# Patient Record
Sex: MALE | Race: WHITE | NOT HISPANIC OR LATINO | Employment: PART TIME | URBAN - NONMETROPOLITAN AREA
[De-identification: names, ages, dates, MRNs, and addresses within clinical notes are randomized per-mention and may not be internally consistent; named-entity substitution may affect disease eponyms.]

---

## 2022-03-23 ENCOUNTER — OFFICE VISIT (OUTPATIENT)
Dept: FAMILY MEDICINE CLINIC | Facility: CLINIC | Age: 57
End: 2022-03-23

## 2022-03-23 VITALS
HEART RATE: 83 BPM | DIASTOLIC BLOOD PRESSURE: 78 MMHG | TEMPERATURE: 97.3 F | BODY MASS INDEX: 34.13 KG/M2 | OXYGEN SATURATION: 95 % | SYSTOLIC BLOOD PRESSURE: 124 MMHG | RESPIRATION RATE: 15 BRPM | HEIGHT: 72 IN | WEIGHT: 252 LBS

## 2022-03-23 DIAGNOSIS — M79.601 PAIN IN BOTH UPPER EXTREMITIES: ICD-10-CM

## 2022-03-23 DIAGNOSIS — M79.602 PAIN IN BOTH UPPER EXTREMITIES: ICD-10-CM

## 2022-03-23 DIAGNOSIS — R53.83 FATIGUE, UNSPECIFIED TYPE: Primary | ICD-10-CM

## 2022-03-23 DIAGNOSIS — F43.21 GRIEF REACTION: ICD-10-CM

## 2022-03-23 DIAGNOSIS — R20.0 BILATERAL FINGER NUMBNESS: ICD-10-CM

## 2022-03-23 DIAGNOSIS — F32.9 REACTIVE DEPRESSION: ICD-10-CM

## 2022-03-23 PROCEDURE — 99204 OFFICE O/P NEW MOD 45 MIN: CPT | Performed by: FAMILY MEDICINE

## 2022-03-23 NOTE — PROGRESS NOTES
New Patient Office Visit      Patient Name: Nilay Olvera  : 1965   MRN: [id number]     Chief Complaint:    Chief Complaint   Patient presents with   • Depression     ESTABLISHING PCP/ PT RECENTLY LOST HIS WIFE AND IS VERY DEPRESSED        History of Present Illness: Nilay Olvera is a 56 y.o. male who is here today to establish care and to work-up a few problems:  #1 and the most important for him is his bilateral arm pain in the anterior arms at his elbows occasionally up to his shoulders.  He contracted Covid  at his mother-in-law's  and then went to the emergency room for evaluation and treatment he said he only got 1 bag of IV fluids and was sent home even before that complete fluid bag completed he says he was dehydrated went home and then was walking around and fell over on top of a case of water bottles was not sure how long he was out but got up and was having arm pain.  He works as a  and had been off work for Covid and then tried to go back to work in January was only able to work 1 week kind of got fed up with how his boss and coworkers  were treating him and so he left work.  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  He fell shortly after his ER visit in December maybe about  or so [facility] they did his right elbow on the day after [place]-- x-rays were negative.  He says it hurts to raise his arms up above his head and cannot lift anything hard to do any curling type exercise.  He says he is upset with the hospital that they kicked him out of the hospital before he got enough IV fluids and he knows that the only reason he passed out was because he was dehydrated and did not get enough IV  fluids for his Covid infection.  His wife passed away  from Covid and he is not happy about her care there at the hospital as well.  #2  Depression and grief reaction says he feels very depressed has no focus or attention says everything seems hazy.  Has no energy.  #3.  Post Covid syndrome-says he has no smell or taste, still feels maybe short of breath at times, achy says is gets rumble all the time.    #4..  He does not see doctors much and is on no medicines other than occasional Advil which does help his arm pain and helps him sleep.  He has no other medications and no known drug allergies.      Subjective      Review of Systems:   Review of Systems    Past Medical History: History reviewed. No pertinent past medical history.    Past Surgical History: History reviewed. No pertinent surgical history.    Family History:   Family History   Problem Relation Age of Onset   • No Known Problems Mother    • No Known Problems Father    • No Known Problems Sister    • No Known Problems Brother    • No Known Problems Maternal Grandmother    • No Known Problems Maternal Grandfather    • No Known Problems Paternal Grandmother    • No Known Problems Paternal Grandfather        Social History:   Social History     Socioeconomic History   • Marital status:    Tobacco Use   • Smoking status: Former Smoker     Packs/day: 1.00     Years: 44.00     Pack years: 44.00     Types: Cigarettes     Quit date:      Years since quittin.2   • Smokeless tobacco: Never Used   Vaping Use   • Vaping Use: Never used   Substance and Sexual Activity   • Alcohol use: Never   • Drug use: Yes     Types: Marijuana   • Sexual activity: Defer       Medications:   No current outpatient medications on file.    Allergies:   No Known Allergies    Objective     Physical Exam:  Vital Signs:   Vitals:    22 1459   BP: 124/78   BP Location: Right arm   Patient Position: Sitting   Cuff Size: Large Adult   Pulse: 83   Resp: 15  "  Temp: 97.3 °F (36.3 °C)   SpO2: 95%   Weight: 114 kg (252 lb)   Height: 181.6 cm (71.5\")   PainSc:   2   PainLoc: Arm     Body mass index is 34.66 kg/m².     Physical Exam  Vitals and nursing note reviewed.   Constitutional:       Appearance: Normal appearance. He is obese.   HENT:      Head: Normocephalic and atraumatic.   Eyes:      Extraocular Movements: Extraocular movements intact.      Pupils: Pupils are equal, round, and reactive to light.   Cardiovascular:      Rate and Rhythm: Normal rate and regular rhythm.      Pulses:           Radial pulses are 2+ on the right side and 2+ on the left side.      Comments: Well-healed scar over distal right forearm  Pulmonary:      Effort: Pulmonary effort is normal.      Breath sounds: Normal breath sounds.   Musculoskeletal:         General: Normal range of motion.      Cervical back: Normal range of motion and neck supple.      Right lower leg: No edema.      Left lower leg: No edema.   Skin:     General: Skin is warm and dry.   Neurological:      General: No focal deficit present.      Mental Status: He is alert.      Cranial Nerves: No cranial nerve deficit.      Motor: No weakness.      Comments: Normal strength of both upper extremities but he had some pain reported doing some of the testing.  Mild tenderness palpation of the anterior arms at the elbows and headed up towards the shoulders bilaterally right little worse than left   Psychiatric:         Mood and Affect: Mood normal.         Behavior: Behavior normal.         Thought Content: Thought content normal.         Judgment: Judgment normal.      Comments: He denies any homicidal suicidal thoughts.  Interacted well during the exam and interview process.         Assessment / Plan      Assessment/Plan:   Diagnoses and all orders for this visit:    1. Fatigue, unspecified type (Primary)  -     CBC w AUTO Differential; Future  -     Comprehensive metabolic panel; Future  -     CK; Future  -     Vitamin B12; " Future  -     CBC w AUTO Differential  -     Comprehensive metabolic panel  -     CK  -     Vitamin B12    2. Reactive depression    3. Grief reaction    4. Pain in both upper extremities    5. Bilateral finger numbness         We will get some basic labs CBC CMP CK and B12 to evaluate and will try to find the [facility] report from [facility] on his [facility] but this was done back in December.  Parenteral diagnosis could include partial tear of the biceps muscles there but I am not sure why he  this far out from the injury that occurred in December.  We discussed possible referral to neurology/orthopedics will have him come back in a couple weeks and reevaluate at that time.  In the meantime he may continue as needed use of his Advil for pain as that has helped him so far risks of GI bleed kidney damage discussed with him.  If any problems worsening sooner return sooner or go to the emergency room.      Follow Up:   Return in about 2 weeks (around 4/6/2022) for Recheck.    Levi Mcrae MD  [facility]

## 2022-03-24 LAB
ALBUMIN SERPL-MCNC: 4.7 G/DL (ref 3.8–4.9)
ALBUMIN/GLOB SERPL: 1.8 {RATIO} (ref 1.2–2.2)
ALP SERPL-CCNC: 88 IU/L (ref 44–121)
ALT SERPL-CCNC: 14 IU/L (ref 0–44)
AST SERPL-CCNC: 19 IU/L (ref 0–40)
BASOPHILS # BLD AUTO: 0.1 X10E3/UL (ref 0–0.2)
BASOPHILS NFR BLD AUTO: 1 %
BILIRUB SERPL-MCNC: 0.6 MG/DL (ref 0–1.2)
BUN SERPL-MCNC: 12 MG/DL (ref 6–24)
BUN/CREAT SERPL: 13 (ref 9–20)
CALCIUM SERPL-MCNC: 9.5 MG/DL (ref 8.7–10.2)
CHLORIDE SERPL-SCNC: 103 MMOL/L (ref 96–106)
CK SERPL-CCNC: 230 U/L (ref 41–331)
CO2 SERPL-SCNC: 24 MMOL/L (ref 20–29)
CREAT SERPL-MCNC: 0.89 MG/DL (ref 0.76–1.27)
EGFRCR SERPLBLD CKD-EPI 2021: 101 ML/MIN/1.73
EOSINOPHIL # BLD AUTO: 0.2 X10E3/UL (ref 0–0.4)
EOSINOPHIL NFR BLD AUTO: 2 %
ERYTHROCYTE [DISTWIDTH] IN BLOOD BY AUTOMATED COUNT: 13.4 % (ref 11.6–15.4)
GLOBULIN SER CALC-MCNC: 2.6 G/DL (ref 1.5–4.5)
GLUCOSE SERPL-MCNC: 74 MG/DL (ref 65–99)
HCT VFR BLD AUTO: 45.7 % (ref 37.5–51)
HGB BLD-MCNC: 15.5 G/DL (ref 13–17.7)
IMM GRANULOCYTES # BLD AUTO: 0 X10E3/UL (ref 0–0.1)
IMM GRANULOCYTES NFR BLD AUTO: 0 %
LYMPHOCYTES # BLD AUTO: 3 X10E3/UL (ref 0.7–3.1)
LYMPHOCYTES NFR BLD AUTO: 33 %
MCH RBC QN AUTO: 31.1 PG (ref 26.6–33)
MCHC RBC AUTO-ENTMCNC: 33.9 G/DL (ref 31.5–35.7)
MCV RBC AUTO: 92 FL (ref 79–97)
MONOCYTES # BLD AUTO: 0.8 X10E3/UL (ref 0.1–0.9)
MONOCYTES NFR BLD AUTO: 9 %
NEUTROPHILS # BLD AUTO: 4.8 X10E3/UL (ref 1.4–7)
NEUTROPHILS NFR BLD AUTO: 55 %
PLATELET # BLD AUTO: 180 X10E3/UL (ref 150–450)
POTASSIUM SERPL-SCNC: 4.1 MMOL/L (ref 3.5–5.2)
PROT SERPL-MCNC: 7.3 G/DL (ref 6–8.5)
RBC # BLD AUTO: 4.98 X10E6/UL (ref 4.14–5.8)
SODIUM SERPL-SCNC: 142 MMOL/L (ref 134–144)
VIT B12 SERPL-MCNC: 449 PG/ML (ref 232–1245)
WBC # BLD AUTO: 8.9 X10E3/UL (ref 3.4–10.8)

## 2022-04-19 ENCOUNTER — OFFICE VISIT (OUTPATIENT)
Dept: FAMILY MEDICINE CLINIC | Facility: CLINIC | Age: 57
End: 2022-04-19

## 2022-04-19 VITALS
OXYGEN SATURATION: 95 % | HEART RATE: 84 BPM | RESPIRATION RATE: 14 BRPM | TEMPERATURE: 97.5 F | HEIGHT: 71 IN | DIASTOLIC BLOOD PRESSURE: 72 MMHG | WEIGHT: 251 LBS | BODY MASS INDEX: 35.14 KG/M2 | SYSTOLIC BLOOD PRESSURE: 114 MMHG

## 2022-04-19 DIAGNOSIS — M79.601 PAIN IN BOTH UPPER EXTREMITIES: Primary | ICD-10-CM

## 2022-04-19 DIAGNOSIS — M79.602 PAIN IN BOTH UPPER EXTREMITIES: Primary | ICD-10-CM

## 2022-04-19 PROCEDURE — 99213 OFFICE O/P EST LOW 20 MIN: CPT | Performed by: FAMILY MEDICINE

## 2022-04-19 NOTE — PROGRESS NOTES
New Patient Office Visit      Patient Name: Nilay Olvera  : 1965   MRN: [id number]     Chief Complaint:    Chief Complaint   Patient presents with   • Follow-up     Pt is to follow up on bloodwork and xrays       History of Present Illness: Nilay Olvera is a 56 y.o. male who is here today to   follow-up on his blood work.  We reviewed this basically all normal.  I do not have a copy of [facility] after his fall back in December day after [place].    He still sore in both arms right little worse than the left basically about the medial part of the elbow distal arm is where he is tender and sore says he can hardly lift anything or move it without getting pain shooting down the entire extremity.  [facility] he was told the x-rays were negative but that he tore the muscles and there.  And that should get better in 4 to 6 weeks it has not so he is now not able to work as a  and is here for follow-up.  Also of note he and his wife both had COVID and he passed away.  He still dealing with the grief does not want any medications for this.    When he had COVID he was getting dehydrated feels the ER did not give him enough IV fluids he came home and passed out fell he thinks up a case of water bottles what saved him from hitting the the tile floor.  Feels like he might of hyperextended his arms at the elbows during that syncopal episode .  He does have some soreness in trapezius and down the arms says if he jerks his arm quickly to catch a falling ball or object that will shoot pain in his arms too.  Says he is getting depressed where he cannot work and normally he is a big strong kendra now cannot ride his [employer]-- had to get a 3 pool.    Subjective      Review of Systems:   Review of Systems    Past Medical History: History reviewed. No pertinent past medical history.    Past Surgical History:   Past Surgical History:   Procedure Laterality Date  "  • LEG SURGERY Left     Left lower leg repair/pins placed   • WRIST FRACTURE SURGERY Right     plastic plate replaced broken bone       Family History:   Family History   Problem Relation Age of Onset   • No Known Problems Mother    • No Known Problems Father    • No Known Problems Sister    • No Known Problems Brother    • No Known Problems Maternal Grandmother    • No Known Problems Maternal Grandfather    • No Known Problems Paternal Grandmother    • No Known Problems Paternal Grandfather        Social History:   Social History     Socioeconomic History   • Marital status:    Tobacco Use   • Smoking status: Former Smoker     Packs/day: 1.00     Years: 44.00     Pack years: 44.00     Types: Cigarettes     Quit date:      Years since quittin.3   • Smokeless tobacco: Never Used   Vaping Use   • Vaping Use: Never used   Substance and Sexual Activity   • Alcohol use: Never   • Drug use: Yes     Types: Marijuana   • Sexual activity: Defer       Medications:   No current outpatient medications on file.    Allergies:   No Known Allergies    Objective     Physical Exam:  Vital Signs:   Vitals:    22 1310   BP: 114/72   BP Location: Left arm   Patient Position: Sitting   Cuff Size: Large Adult   Pulse: 84   Resp: 14   Temp: 97.5 °F (36.4 °C)   SpO2: 95%   Weight: 114 kg (251 lb)   Height: 180.3 cm (71\")   PainSc:   6   PainLoc: Arm     Body mass index is 35.01 kg/m².        Physical Exam  Constitutional:       Appearance: Normal appearance.   Neurological:      General: No focal deficit present.      Mental Status: He is alert and oriented to person, place, and time.      Comments: Third oriented white male no acute distress is muscular he has tenderness on the medial distal arm about the elbow more so than I expected for light palpation.  2+ radial pulses bilaterally.  When testing strength of upper extremities he says it hurts and is not able to resist me.  Mild palpable tenderness to the bilateral " trapezius muscles    Psychiatric:         Mood and Affect: Mood normal.         Behavior: Behavior normal.         Procedures    Assessment / Plan      Assessment/Plan:   Diagnoses and all orders for this visit:    1. Pain in both upper extremities (Primary)  -     Ambulatory Referral to Neurology  -     Ambulatory Referral to Orthopedic Surgery         Refer to orthopedics and neurology to make sure this is not nerve root compression coming from his C-spine or other neurological disorder--perhaps they can get an EMG.  And will have him see Ortho as well perhaps this is just muscle strain/tear that needs more time to rehab may be needs physical therapy?    He will continue with as needed Motrin he declined any treatment for depression/ grief reaction     follow-up in a couple months we will see how he is doing may consider Cymbalta at that point after his specialist evaluation.       Follow Up:   Return in about 2 months (around 6/19/2022) for Recheck.    Levi Mcrae MD  [facility] [place] [facility]

## 2022-05-10 ENCOUNTER — OFFICE VISIT (OUTPATIENT)
Dept: ORTHOPEDIC SURGERY | Facility: CLINIC | Age: 57
End: 2022-05-10

## 2022-05-10 VITALS
BODY MASS INDEX: 35.19 KG/M2 | DIASTOLIC BLOOD PRESSURE: 76 MMHG | SYSTOLIC BLOOD PRESSURE: 122 MMHG | WEIGHT: 251.32 LBS | HEIGHT: 71 IN

## 2022-05-10 DIAGNOSIS — M25.522 BILATERAL ELBOW JOINT PAIN: Primary | ICD-10-CM

## 2022-05-10 DIAGNOSIS — S59.802A HYPEREXTENSION INJURY OF LEFT ELBOW, INITIAL ENCOUNTER: ICD-10-CM

## 2022-05-10 DIAGNOSIS — M25.622 DECREASED RANGE OF MOTION OF LEFT ELBOW: ICD-10-CM

## 2022-05-10 DIAGNOSIS — S59.801A HYPEREXTENSION INJURY OF RIGHT ELBOW, INITIAL ENCOUNTER: ICD-10-CM

## 2022-05-10 DIAGNOSIS — M25.621 DECREASED RANGE OF MOTION OF RIGHT ELBOW: ICD-10-CM

## 2022-05-10 DIAGNOSIS — M25.521 BILATERAL ELBOW JOINT PAIN: Primary | ICD-10-CM

## 2022-05-10 PROCEDURE — 99203 OFFICE O/P NEW LOW 30 MIN: CPT | Performed by: PHYSICIAN ASSISTANT

## 2022-05-10 NOTE — PROGRESS NOTES
[facility] [place] [facility] Note    Subjective     Chief Complaint   Patient presents with   • Right Elbow - Pain   • Left Elbow - Pain   DOI: around 12/24/2021     FIDELINA Olvera is a 56 y.o. male.  Right-hand-dominant. New patient presents for evaluation of bilateral elbow pain.  DAVID: Patient fell ?hyperextnsion injury bilateral elbows resulting in chronic pain medial into anterior elbows (antecubital fossa), inability to fully extend or pronate. Now with decreased motion and persistent pain.    Occupation: , unable to work--unable to lift , flex elbows with weight, extend elbows.    Unfortunately his wife passed away from Covid 1/5/2022.    Pain scale: varies depending on activity attempted.  Severity of the pain moderate to severe at times, especially in right.  Quality of the pain aching, throbbing, shooting.  Associated symptoms swelling, bruising (medial elbows), giving away/buckling.  Activity related to pain working, driving, movement of joint.  Pain eased by nothing.  No reported numbness or tingling.  Prior treatments home exercises, OTC pain medication (Advil).    Notes difficulty with lifting, pulling, reaching, dressing, driving and overhead activities.    Denies fever, chills, night sweats or other constitutional symptoms.      History reviewed. No pertinent past medical history.   Past Surgical History:   Procedure Laterality Date   • LEG SURGERY Left     Left lower leg repair/pins placed   • WRIST FRACTURE SURGERY Right     plastic plate replaced broken bone      Family History   Problem Relation Age of Onset   • No Known Problems Mother    • No Known Problems Father    • No Known Problems Sister    • No Known Problems Brother    • No Known Problems Maternal Grandmother    • No Known Problems Maternal Grandfather    • No Known Problems Paternal Grandmother    • No Known Problems Paternal Grandfather      Social History     Socioeconomic History   • Marital status:   "  Tobacco Use   • Smoking status: Former Smoker     Packs/day: 1.00     Years: 44.00     Pack years: 44.00     Types: Cigarettes     Quit date:      Years since quittin.3   • Smokeless tobacco: Never Used   Vaping Use   • Vaping Use: Never used   Substance and Sexual Activity   • Alcohol use: Never   • Drug use: Yes     Types: Marijuana   • Sexual activity: Defer      No current outpatient medications on file prior to visit.     No current facility-administered medications on file prior to visit.      No Known Allergies     The following portions of the patient's history were reviewed and updated as appropriate: allergies, current medications, past family history, past medical history, past social history, past surgical history and problem list.    Review of Systems   Constitutional: Negative.    HENT: Negative.    Eyes: Negative.    Respiratory: Negative.    Cardiovascular: Negative.    Gastrointestinal: Negative.    Endocrine: Negative.    Genitourinary: Negative.    Musculoskeletal: Positive for arthralgias.   Skin: Negative.    Allergic/Immunologic: Negative.    Neurological: Negative.    Hematological: Negative.    Psychiatric/Behavioral: Negative.         Objective      Physical Exam  /76   Ht 180.3 cm (70.98\")   Wt 114 kg (251 lb 5.2 oz)   BMI 35.07 kg/m²     Body mass index is 35.07 kg/m².    GENERAL APPEARANCE: awake, alert & oriented x 3, in no acute distress and well developed, well nourished  PSYCH: normal mood and affect  LUNGS:  breathing nonlabored, no wheezing  EYES: sclera anicteric, pupils equal  CARDIOVASCULAR: palpable pulses. Capillary refill less than 2 seconds  INTEGUMENTARY: skin intact, no clubbing, cyanosis  NEUROLOGIC:  Normal Sensation        Ortho Exam  Bilateral Elbow--right > left symptomatic  Skin: Intact without any erythema, warmth. ?soft tissue swelling  Tenderness: Lat epicondyle/common ext tendons/ECRB negative.  Med epicondyle/common flx tendon and pronator " group negative.  Radial head negative.  Olecranon negative.  Triceps insertion negative.  Biceps insertion/antecubital fossa positive--withdrawals extremity to pain with palpation.   Motion: Extension 20/30° short of full extension.  Flexion 150°.  Supination 85°.  Pronation 45°.  Instability: Varus and valgus stress at 0°/30°/90° negative.  Axial load negative.  Strength: 4/5 flexors/biceps, pronation; 5/5 extensors, supinators  Testing: Resisted flexion elbow positive.  Restricted flexion the wrist mild  discomfort.  Hook test intact biceps tendon.  Resisted extension elbow negative.  Resisted extension wrist negative.    Cubital tunnel: Tinel's negative.  Elbow flexion test negative.  Motor: Grossly intact R/U/M/AIN/PIN.  Sensory: Grossly intact to light touch R/U/M.  Vascular: 2+ radial pulse with brisk capillary refill into each digit.      Imaging/Studies  Ordered bilateral elbows plain films.  Imaging read/interpreted by Dr. Joe.    Imaging Results (Last 7 Days)     Procedure Component Value Units Date/Time    XR Elbow 3+ View Bilateral [[id number]] Resulted: 05/10/22 1255     Updated: 05/10/22 1256    Narrative:      Bilateral Elbow X-Ray    Indication: Pain    Views: AP, oblique and Lateral     Comparison: None    Findings:  No fracture  No bony lesion  Normal soft tissues  Degenerative changes are noted at the anterior aspect of the ulnohumeral   articulation most clearly demonstrated on the lateral views of each elbow    Impression: Degenerative changes bilateral elbows.  No acute bony   abnormality appreciated.                  Assessment/Plan        ICD-10-CM ICD-9-CM   1. Bilateral elbow joint pain  M25.521 719.42    M25.522    2. Decreased range of motion of right elbow  M25.621 719.52   3. Decreased range of motion of left elbow  M25.622 719.52   4. Hyperextension injury of right elbow, initial encounter  S59.801A 959.3   5. Hyperextension injury of left elbow, initial encounter  S59.802A 959.3        Orders Placed This Encounter   Procedures   • XR Elbow 3+ View Bilateral   • MRI Elbow Left Without Contrast   • MRI Elbow Right Without Contrast        -Bilateral elbow pain, decreased motion (right greater than left) due to bilateral elbow hyperextension injuries.  -Possible arthrofibrosis to both elbows due to decreased motion.  -Based on chronicity, pain, decreased motion ordered MRI to further assess. If only can do one elbow at a time then patient wants to proceed with right first.  -Continue OTC pain medication.  -Follow up after MRI's completed to discuss results and treatment options. Appointment needs to be on Tuesday or Thursday.   -Questions and concerns answered.    History, exam and imaging discussed with Dr. Joe, who agrees with the above assessment and plan.      Medical Decision Making  Management Options : over-the-counter medicine  Data/Risk: radiology tests       Darcy Figueroa PA-C  05/10/22  23:23 EDT               EMR Dragon/Transcription disclaimer:  Much of this encounter note is an electronic transcription of spoken language to printed text. Electronic transcription of spoken language may permit erroneous, or at times, nonsensical words or phrases to be inadvertently transcribed. Although I have reviewed the note for such errors, some may still exist.

## 2022-05-26 ENCOUNTER — OFFICE VISIT (OUTPATIENT)
Dept: FAMILY MEDICINE CLINIC | Facility: CLINIC | Age: 57
End: 2022-05-26

## 2022-05-26 VITALS
BODY MASS INDEX: 33.02 KG/M2 | HEART RATE: 78 BPM | DIASTOLIC BLOOD PRESSURE: 80 MMHG | OXYGEN SATURATION: 98 % | HEIGHT: 72 IN | SYSTOLIC BLOOD PRESSURE: 122 MMHG | WEIGHT: 243.8 LBS | TEMPERATURE: 98.4 F

## 2022-05-26 DIAGNOSIS — F43.21 GRIEF REACTION: Primary | ICD-10-CM

## 2022-05-26 PROCEDURE — 99215 OFFICE O/P EST HI 40 MIN: CPT | Performed by: PHYSICIAN ASSISTANT

## 2022-05-26 NOTE — PROGRESS NOTES
Answers for HPI/ROS submitted by the patient on 2022  What is the primary reason for your visit?: Other  Please describe your symptoms.: bad depression  Have you had these symptoms before?: No  How long have you been having these symptoms?: Greater than 2 weeks  Please list any medications you are currently taking for this condition.: none  Please describe any probable cause for these symptoms. : lost motherinlaw, lost my best friend my wife, lost job  on and on          Patient Office Visit      Patient Name: Nilay Olvera  : 1965   MRN: [id number]     Chief Complaint:    Chief Complaint   Patient presents with   • Depression     Not sleeping, not eating, feeling hopeless.       History of Present Illness: Nilay Olvera is a 56 y.o. male who is here today asking for help with his depression.  He lost his mother-in-law to COVID in December.  He and his wife both caught COVID at the  and he lost his wife to COVID in January.  He says they have been together 20 years and were best friends and he is devastated.  He thought he could handle on his own but he is now asking for some help.  He is talked to Dr. Mcrae about this in the past and previously had declined trying any type of medication.  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  He says currently he does not have to work as he has the financial means to continue to pay all of his bills.  He just wants to get to a point where he is not crying all the time.  He says he isolates and really does not want to get out and do anything.  He denies any thoughts of suicide or homicide.    Subjective      Review of Systems:   Review of Systems   Psychiatric/Behavioral: Positive for decreased concentration and dysphoric mood. Negative for suicidal ideas.        Past Medical History:   Past Medical History:   Diagnosis Date   • Depression        Past Surgical  "History:   Past Surgical History:   Procedure Laterality Date   • LEG SURGERY Left     Left lower leg repair/pins placed   • WRIST FRACTURE SURGERY Right     plastic plate replaced broken bone       Family History:   Family History   Problem Relation Age of Onset   • No Known Problems Mother    • No Known Problems Father    • No Known Problems Sister    • No Known Problems Brother    • No Known Problems Maternal Grandmother    • No Known Problems Maternal Grandfather    • No Known Problems Paternal Grandmother    • No Known Problems Paternal Grandfather        Social History:   Social History     Socioeconomic History   • Marital status:    Tobacco Use   • Smoking status: Former Smoker     Packs/day: 1.00     Years: 44.00     Pack years: 44.00     Types: Cigarettes     Quit date:      Years since quittin.4   • Smokeless tobacco: Never Used   Vaping Use   • Vaping Use: Never used   Substance and Sexual Activity   • Alcohol use: Never   • Drug use: Yes     Types: Marijuana   • Sexual activity: Defer       Allergies:   No Known Allergies    Objective     Physical Exam:  Vital Signs:   Vitals:    22 1330   BP: 122/80   Pulse: 78   Temp: 98.4 °F (36.9 °C)   SpO2: 98%   Weight: 111 kg (243 lb 12.8 oz)   Height: 181.6 cm (71.5\")     Body mass index is 33.53 kg/m².          Physical Exam  Constitutional:       Appearance: He is obese.   Psychiatric:         Attention and Perception: Attention normal.         Mood and Affect: Mood is depressed. Affect is tearful.         Speech: Speech normal.         Behavior: Behavior normal. Behavior is cooperative.         Thought Content: Thought content normal.         Cognition and Memory: Cognition normal.         Judgment: Judgment normal.      Comments: Patient is very sad.         Procedures    Assessment / Plan      Assessment/Plan:   Diagnoses and all orders for this visit:    1. Grief reaction (Primary)  -     sertraline (Zoloft) 50 MG tablet; Take 1 " tablet by mouth Daily.  Dispense: 30 tablet; Refill: 0    Most of the visit spent supportive counseling and listening to patient.  I validated that what he is going through is pretty normal.  The first year will be the hardest as he has to cycle through the different seasons without his wife.  He is having a very difficult time functioning so we will try him on low-dose sertraline and see if this will take the edge off.  He knows that this is not going to help after just a few days.  I am going to have him follow-up in 1 week as we need to monitor him closely.    Medications:     Current Outpatient Medications:   •  sertraline (Zoloft) 50 MG tablet, Take 1 tablet by mouth Daily., Disp: 30 tablet, Rfl: 0    I spent 40 minutes caring for Nilay on this date of service. This time includes time spent by me in the following activities:preparing for the visit, obtaining and/or reviewing a separately obtained history, performing a medically appropriate examination and/or evaluation , counseling and educating the patient/family/caregiver and documenting information in the medical record    Follow Up:   Return in about 1 week (around 6/2/2022) for Recheck.    Alma Delia Garza PA-C   [facility]

## 2022-06-02 ENCOUNTER — OFFICE VISIT (OUTPATIENT)
Dept: FAMILY MEDICINE CLINIC | Facility: CLINIC | Age: 57
End: 2022-06-02

## 2022-06-02 ENCOUNTER — TELEPHONE (OUTPATIENT)
Dept: ORTHOPEDIC SURGERY | Facility: CLINIC | Age: 57
End: 2022-06-02

## 2022-06-02 VITALS
SYSTOLIC BLOOD PRESSURE: 140 MMHG | TEMPERATURE: 97.7 F | BODY MASS INDEX: 33.32 KG/M2 | DIASTOLIC BLOOD PRESSURE: 90 MMHG | RESPIRATION RATE: 15 BRPM | WEIGHT: 238 LBS | HEART RATE: 86 BPM | OXYGEN SATURATION: 99 % | HEIGHT: 71 IN

## 2022-06-02 DIAGNOSIS — F43.21 GRIEF REACTION: ICD-10-CM

## 2022-06-02 PROBLEM — F43.20 GRIEF REACTION: Status: ACTIVE | Noted: 2022-06-02

## 2022-06-02 PROCEDURE — 99213 OFFICE O/P EST LOW 20 MIN: CPT | Performed by: PHYSICIAN ASSISTANT

## 2022-06-02 NOTE — TELEPHONE ENCOUNTER
Provider: DICKINSON  Caller: REBECCA JARRETT  Relationship to Patient: PATIENT  Pharmacy: N/A  Phone Number: [phone]  Reason for Call: PATIENT MRI SCHEDULED FOR 6.18.22 AND PATIENT WOULD LIKE TO BE SEEN FOR F/U/RESULTS BEFORE FIRST AVAILABLE, WHICH IS 6.30.22  When was the patient last seen:5.10.22

## 2022-06-02 NOTE — PROGRESS NOTES
Patient Office Visit      Patient Name: Nilay Olvera  : 1965   MRN: [id number]     Chief Complaint:    Chief Complaint   Patient presents with   • Depression       History of Present Illness: Nilay Olvera is a 56 y.o. male who is here today to follow-up on grief/depression.  We started him on sertraline 50 mg last week.  He can already tell this is helping however it is making him very tired.  He had been unable to sleep prior and thinks maybe he is tired because his anxiety is somewhat better.  Not crying as much.    Subjective    Answers for HPI/ROS submitted by the patient on 2022  What is the primary reason for your visit?: Other  Please describe your symptoms.: follow up  Have you had these symptoms before?: Yes  How long have you been having these symptoms?: Greater than 2 weeks      Review of Systems:   Review of Systems   Constitutional: Positive for fatigue.   Psychiatric/Behavioral: Positive for dysphoric mood ( Improving) and sleep disturbance ( Improving). The patient is not nervous/anxious.         Past Medical History:   Past Medical History:   Diagnosis Date   • Depression        Past Surgical History:   Past Surgical History:   Procedure Laterality Date   • LEG SURGERY Left     Left lower leg repair/pins placed   • WRIST FRACTURE SURGERY Right     plastic plate replaced broken bone       Family History:   Family History   Problem Relation Age of Onset   • No Known Problems Mother    • No Known Problems Father    • No Known Problems Sister    • No Known Problems Brother    • No Known Problems Maternal Grandmother    • No Known Problems Maternal Grandfather    • No Known Problems Paternal Grandmother    • No Known Problems Paternal Grandfather        Social History:   Social History     Socioeconomic History   • Marital status:    Tobacco Use   • Smoking status: Former Smoker     Packs/day: 1.00     Years: 44.00     Pack years: 44.00     Types: Cigarettes     Quit date:  "     Years since quittin.4   • Smokeless tobacco: Never Used   Vaping Use   • Vaping Use: Never used   Substance and Sexual Activity   • Alcohol use: Never   • Drug use: Yes     Types: Marijuana   • Sexual activity: Defer       Allergies:   No Known Allergies    Objective     Physical Exam:  Vital Signs:   Vitals:    22 0803   BP: 140/90   BP Location: Right arm   Patient Position: Sitting   Cuff Size: Adult   Pulse: 86   Resp: 15   Temp: 97.7 °F (36.5 °C)   TempSrc: Temporal   SpO2: 99%   Weight: 108 kg (238 lb)   Height: 180.3 cm (71\")     Body mass index is 33.19 kg/m².          Physical Exam  Constitutional:       General: He is not in acute distress.     Appearance: Normal appearance.   Neurological:      Mental Status: He is alert.   Psychiatric:         Mood and Affect: Mood normal.         Behavior: Behavior normal.         Thought Content: Thought content normal.         Judgment: Judgment normal.         Procedures    Assessment / Plan      Assessment/Plan:   Diagnoses and all orders for this visit:    1. Grief reaction  Assessment & Plan:  Continue sertraline 50 mg for now.  I did write a new prescription to increase to 75 mg which she can do at his discretion and follow-up again in 6 weeks.  He has been a little hard on himself over his emotional state.  I asked him to think about how he would treat his best friend if he were going through something similar and he said he would be supportive and was there for him.  I asked him to try to do for himself what he would do for a good friend.    Orders:  -     sertraline (Zoloft) 50 MG tablet; Take 1.5 tablets by mouth Daily.  Dispense: 45 tablet; Refill: 0       Medications:     Current Outpatient Medications:   •  sertraline (Zoloft) 50 MG tablet, Take 1.5 tablets by mouth Daily., Disp: 45 tablet, Rfl: 0        Follow Up:   Return in about 6 weeks (around 2022) for Recheck.    Alma Delia Garza PA-C   [facility]   "

## 2022-06-02 NOTE — ASSESSMENT & PLAN NOTE
Continue sertraline 50 mg for now.  I did write a new prescription to increase to 75 mg which she can do at his discretion and follow-up again in 6 weeks.  He has been a little hard on himself over his emotional state.  I asked him to think about how he would treat his best friend if he were going through something similar and he said he would be supportive and was there for him.  I asked him to try to do for himself what he would do for a good friend.

## 2022-06-18 ENCOUNTER — APPOINTMENT (OUTPATIENT)
Dept: MRI IMAGING | Facility: HOSPITAL | Age: 57
End: 2022-06-18

## 2022-07-14 ENCOUNTER — OFFICE VISIT (OUTPATIENT)
Dept: FAMILY MEDICINE CLINIC | Facility: CLINIC | Age: 57
End: 2022-07-14

## 2022-07-14 VITALS
DIASTOLIC BLOOD PRESSURE: 80 MMHG | RESPIRATION RATE: 15 BRPM | TEMPERATURE: 98.6 F | SYSTOLIC BLOOD PRESSURE: 132 MMHG | HEART RATE: 78 BPM | WEIGHT: 233.8 LBS | HEIGHT: 71 IN | BODY MASS INDEX: 32.73 KG/M2 | OXYGEN SATURATION: 98 %

## 2022-07-14 DIAGNOSIS — F43.21 GRIEF REACTION: Primary | ICD-10-CM

## 2022-07-14 DIAGNOSIS — Z12.11 SCREENING FOR COLON CANCER: ICD-10-CM

## 2022-07-14 PROCEDURE — 99214 OFFICE O/P EST MOD 30 MIN: CPT | Performed by: PHYSICIAN ASSISTANT

## 2022-07-14 RX ORDER — SERTRALINE HYDROCHLORIDE 100 MG/1
100 TABLET, FILM COATED ORAL DAILY
Qty: 90 TABLET | Refills: 1 | Status: SHIPPED | OUTPATIENT
Start: 2022-07-14 | End: 2023-01-11

## 2022-07-14 NOTE — PROGRESS NOTES
Answers for HPI/ROS submitted by the patient on 2022  What is the primary reason for your visit?: Other  Please describe your symptoms.: follow up  Have you had these symptoms before?: Yes  How long have you been having these symptoms?: Greater than 2 weeks          Patient Office Visit      Patient Name: Nilay Olvera  : 1965   MRN: [id number]     Chief Complaint:    Chief Complaint   Patient presents with   • Depression       History of Present Illness: Nilay Olvera is a 57 y.o. male who is here today to follow-up on grief reaction after losing his wife to COVID in January.    Subjective      Review of Systems:   Review of Systems   Psychiatric/Behavioral: Positive for dysphoric mood.        Past Medical History:   Past Medical History:   Diagnosis Date   • Depression        Past Surgical History:   Past Surgical History:   Procedure Laterality Date   • LEG SURGERY Left     Left lower leg repair/pins placed   • WRIST FRACTURE SURGERY Right     plastic plate replaced broken bone       Family History:   Family History   Problem Relation Age of Onset   • No Known Problems Mother    • No Known Problems Father    • No Known Problems Sister    • No Known Problems Brother    • No Known Problems Maternal Grandmother    • No Known Problems Maternal Grandfather    • No Known Problems Paternal Grandmother    • No Known Problems Paternal Grandfather        Social History:   Social History     Socioeconomic History   • Marital status:    Tobacco Use   • Smoking status: Former Smoker     Packs/day: 1.00     Years: 44.00     Pack years: 44.00     Types: Cigarettes     Quit date:      Years since quittin.5   • Smokeless tobacco: Never Used   Vaping Use   • Vaping Use: Never used   Substance and Sexual Activity   • Alcohol use: Never   • Drug use: Yes     Types: Marijuana   • Sexual activity: Defer       Allergies:   No Known Allergies    Objective     Physical Exam:  Vital Signs:   Vitals:     "07/14/22 0805   BP: 132/80   BP Location: Right arm   Patient Position: Sitting   Cuff Size: Adult   Pulse: 78   Resp: 15   Temp: 98.6 °F (37 °C)   TempSrc: Temporal   SpO2: 98%   Weight: 106 kg (233 lb 12.8 oz)   Height: 180.3 cm (71\")     Body mass index is 32.61 kg/m².        Physical Exam  Constitutional:       General: He is not in acute distress.     Appearance: Normal appearance. He is obese.   Neurological:      Mental Status: He is alert.   Psychiatric:         Mood and Affect: Mood normal.         Behavior: Behavior normal.         Thought Content: Thought content normal.         Judgment: Judgment normal.      Comments: Mood is significantly improved from when I first started seeing him.         Procedures    Assessment / Plan      Assessment/Plan:   Diagnoses and all orders for this visit:    1. Grief reaction (Primary)  Assessment & Plan:  Mood is significantly improved since I first started seeing him.  He is currently on sertraline 75 mg/day.  He does this is helping and would like to increase to 100 mg.  He did try some grief counseling in the beginning and he said this just seemed to make it worse.  He wants to try to return to working on a month or 2.  He is working on the house that he has been renting and is in the process of planning to purchase this home.  He is also showing some interest in his own health saying that he has never had a colonoscopy and wants to be referred.  Good to have him follow-up again in 6 months but he should come back sooner if he is having any issues.    Orders:  -     sertraline (Zoloft) 100 MG tablet; Take 1 tablet by mouth Daily.  Dispense: 90 tablet; Refill: 1    2. Screening for colon cancer  -     Ambulatory Referral to General Surgery       Medications:     Current Outpatient Medications:   •  sertraline (Zoloft) 100 MG tablet, Take 1 tablet by mouth Daily., Disp: 90 tablet, Rfl: 1    I spent 30 minutes caring for Nilay on this date of service. This time " includes time spent by me in the following activities:performing a medically appropriate examination and/or evaluation , counseling and educating the patient/family/caregiver, referring and communicating with other health care professionals  and documenting information in the medical record    Follow Up:   Return in about 6 months (around 1/14/2023) for Annual physical with labs one week prior.    Alma Delia Garza PA-C   [facility]

## 2022-07-14 NOTE — ASSESSMENT & PLAN NOTE
Mood is significantly improved since I first started seeing him.  He is currently on sertraline 75 mg/day.  He does this is helping and would like to increase to 100 mg.  He did try some grief counseling in the beginning and he said this just seemed to make it worse.  He wants to try to return to working on a month or 2.  He is working on the house that he has been renting and is in the process of planning to purchase this home.  He is also showing some interest in his own health saying that he has never had a colonoscopy and wants to be referred.  Good to have him follow-up again in 6 months but he should come back sooner if he is having any issues.

## 2022-08-21 ENCOUNTER — HOSPITAL ENCOUNTER (OUTPATIENT)
Dept: MRI IMAGING | Facility: HOSPITAL | Age: 57
Discharge: HOME | End: 2022-08-21

## 2022-08-21 DIAGNOSIS — M25.622 DECREASED RANGE OF MOTION OF LEFT ELBOW: ICD-10-CM

## 2022-08-21 DIAGNOSIS — M25.521 BILATERAL ELBOW JOINT PAIN: ICD-10-CM

## 2022-08-21 DIAGNOSIS — M25.522 BILATERAL ELBOW JOINT PAIN: ICD-10-CM

## 2022-08-21 DIAGNOSIS — S59.801A HYPEREXTENSION INJURY OF RIGHT ELBOW, INITIAL ENCOUNTER: ICD-10-CM

## 2022-08-21 DIAGNOSIS — S59.802A HYPEREXTENSION INJURY OF LEFT ELBOW, INITIAL ENCOUNTER: ICD-10-CM

## 2022-08-21 DIAGNOSIS — M25.621 DECREASED RANGE OF MOTION OF RIGHT ELBOW: ICD-10-CM

## 2022-08-21 PROCEDURE — 73221 MRI JOINT UPR EXTREM W/O DYE: CPT

## 2022-08-23 ENCOUNTER — TELEPHONE (OUTPATIENT)
Dept: ORTHOPEDIC SURGERY | Facility: CLINIC | Age: 57
End: 2022-08-23

## 2022-08-23 NOTE — TELEPHONE ENCOUNTER
CALLED PATIENT TO SCHEDULE A FOLLOW UP APPOINTMENT TO REVIEW MRI RESULTS WITH [facility]. LEFT JUSTIN.

## 2022-08-23 NOTE — TELEPHONE ENCOUNTER
----- Message from Darcy Figueroa PA-C sent at 8/22/2022  7:35 PM EDT -----  Please schedule patient on Tuesday or Thursday for MRI follow up.    Thanks,  Darcy

## 2022-08-30 ENCOUNTER — OFFICE VISIT (OUTPATIENT)
Dept: ORTHOPEDIC SURGERY | Facility: CLINIC | Age: 57
End: 2022-08-30

## 2022-08-30 VITALS
BODY MASS INDEX: 31.89 KG/M2 | SYSTOLIC BLOOD PRESSURE: 116 MMHG | WEIGHT: 227.8 LBS | DIASTOLIC BLOOD PRESSURE: 74 MMHG | HEIGHT: 71 IN

## 2022-08-30 DIAGNOSIS — S46.211D BICEPS RUPTURE, DISTAL, RIGHT, SUBSEQUENT ENCOUNTER: ICD-10-CM

## 2022-08-30 DIAGNOSIS — M77.12 LATERAL EPICONDYLITIS OF BOTH ELBOWS: ICD-10-CM

## 2022-08-30 DIAGNOSIS — M25.521 BILATERAL ELBOW JOINT PAIN: Primary | ICD-10-CM

## 2022-08-30 DIAGNOSIS — M19.021 PRIMARY OSTEOARTHRITIS OF BOTH ELBOWS: ICD-10-CM

## 2022-08-30 DIAGNOSIS — M25.522 BILATERAL ELBOW JOINT PAIN: Primary | ICD-10-CM

## 2022-08-30 DIAGNOSIS — M77.11 LATERAL EPICONDYLITIS OF BOTH ELBOWS: ICD-10-CM

## 2022-08-30 DIAGNOSIS — M19.022 PRIMARY OSTEOARTHRITIS OF BOTH ELBOWS: ICD-10-CM

## 2022-08-30 PROCEDURE — 99213 OFFICE O/P EST LOW 20 MIN: CPT | Performed by: PHYSICIAN ASSISTANT

## 2022-10-31 ENCOUNTER — OFFICE VISIT (OUTPATIENT)
Dept: ORTHOPEDIC SURGERY | Facility: CLINIC | Age: 57
End: 2022-10-31

## 2022-10-31 VITALS
BODY MASS INDEX: 30.8 KG/M2 | HEIGHT: 71 IN | WEIGHT: 220 LBS | DIASTOLIC BLOOD PRESSURE: 70 MMHG | SYSTOLIC BLOOD PRESSURE: 118 MMHG

## 2022-10-31 DIAGNOSIS — M19.021 PRIMARY OSTEOARTHRITIS OF BOTH ELBOWS: ICD-10-CM

## 2022-10-31 DIAGNOSIS — S46.211D BICEPS RUPTURE, DISTAL, RIGHT, SUBSEQUENT ENCOUNTER: Primary | ICD-10-CM

## 2022-10-31 DIAGNOSIS — M77.12 LATERAL EPICONDYLITIS OF BOTH ELBOWS: ICD-10-CM

## 2022-10-31 DIAGNOSIS — M19.022 PRIMARY OSTEOARTHRITIS OF BOTH ELBOWS: ICD-10-CM

## 2022-10-31 DIAGNOSIS — M77.11 LATERAL EPICONDYLITIS OF BOTH ELBOWS: ICD-10-CM

## 2022-10-31 PROCEDURE — 99213 OFFICE O/P EST LOW 20 MIN: CPT | Performed by: PHYSICIAN ASSISTANT

## 2023-01-10 ENCOUNTER — OFFICE VISIT (OUTPATIENT)
Dept: FAMILY MEDICINE CLINIC | Facility: CLINIC | Age: 58
End: 2023-01-10
Payer: MEDICAID

## 2023-01-10 VITALS
SYSTOLIC BLOOD PRESSURE: 118 MMHG | HEIGHT: 72 IN | WEIGHT: 227 LBS | HEART RATE: 52 BPM | BODY MASS INDEX: 30.75 KG/M2 | DIASTOLIC BLOOD PRESSURE: 80 MMHG | OXYGEN SATURATION: 97 %

## 2023-01-10 DIAGNOSIS — M54.42 ACUTE LEFT-SIDED LOW BACK PAIN WITH LEFT-SIDED SCIATICA: Primary | ICD-10-CM

## 2023-01-10 PROCEDURE — 99213 OFFICE O/P EST LOW 20 MIN: CPT | Performed by: FAMILY MEDICINE

## 2023-01-10 RX ORDER — METHYLPREDNISOLONE 4 MG/1
TABLET ORAL
COMMUNITY
Start: 2023-01-04 | End: 2023-01-10

## 2023-01-10 RX ORDER — IBUPROFEN 800 MG/1
800 TABLET ORAL EVERY 6 HOURS PRN
COMMUNITY
Start: 2023-01-04 | End: 2023-01-10

## 2023-01-10 RX ORDER — METHOCARBAMOL 750 MG/1
750 TABLET, FILM COATED ORAL 4 TIMES DAILY
COMMUNITY
Start: 2023-01-04 | End: 2023-01-11 | Stop reason: SDUPTHER

## 2023-01-10 NOTE — ASSESSMENT & PLAN NOTE
We are going to take him off his ibuprofen 800.  We can start him on meloxicam 15 mg once a day.  We are going get an LS spine.  We are to send him to physical therapy.  Return to clinic if worse.

## 2023-01-10 NOTE — PROGRESS NOTES
Patient Name: Nilay Olvera  : 1965   MRN: [id number]     Chief Complaint:    Chief Complaint   Patient presents with   • Back Pain       History of Present Illness: Nilay Olvera is a 57 y.o. male who is here today for follow up for low back pain  HPI        Review of Systems:   Review of Systems   Constitutional: Negative.    HENT: Negative.    Eyes: Negative.    Respiratory: Negative.    Musculoskeletal: Positive for back pain.        Past Medical History:   Past Medical History:   Diagnosis Date   • Depression        Past Surgical History:   Past Surgical History:   Procedure Laterality Date   • LEG SURGERY Left     Left lower leg repair/pins placed   • WRIST FRACTURE SURGERY Right     plastic plate replaced broken bone       Family History:   Family History   Problem Relation Age of Onset   • No Known Problems Mother    • No Known Problems Father    • No Known Problems Sister    • No Known Problems Brother    • No Known Problems Maternal Grandmother    • No Known Problems Maternal Grandfather    • No Known Problems Paternal Grandmother    • No Known Problems Paternal Grandfather        Social History:   Social History     Socioeconomic History   • Marital status:    Tobacco Use   • Smoking status: Former     Packs/day: 1.00     Years: 44.00     Pack years: 44.00     Types: Cigarettes     Start date: 1977     Quit date:      Years since quittin.0   • Smokeless tobacco: Never   Vaping Use   • Vaping Use: Never used   Substance and Sexual Activity   • Alcohol use: Never   • Drug use: Yes     Types: Marijuana   • Sexual activity: Defer       Medications:     Current Outpatient Medications:   •  methocarbamol (ROBAXIN) 750 MG tablet, Take 750 mg by mouth 4 (Four) Times a Day., Disp: , Rfl:   •  sertraline (Zoloft) 100 MG tablet, Take 1 tablet by mouth Daily., Disp: 90 tablet, Rfl: 1  •  Meloxicam 15 MG tablet dispersible, Place 15 mg on the tongue Daily As Needed (pain).,  Disp: 30 tablet, Rfl: 2  •  methylPREDNISolone (MEDROL) 4 MG dose pack, , Disp: , Rfl:     Allergies:   No Known Allergies      Physical Exam:  Vital Signs:   Vitals:    01/10/23 1126   BP: 118/80   BP Location: Left arm   Patient Position: Sitting   Cuff Size: Adult   Pulse: 52   SpO2: 97%   Weight: 103 kg (227 lb)   Height: 181.6 cm (71.5\")   PainSc:   8   PainLoc: Back     Body mass index is 31.22 kg/m².     Physical Exam  Vitals and nursing note reviewed.   Constitutional:       Appearance: Normal appearance. He is normal weight.   HENT:      Head: Normocephalic and atraumatic.      Right Ear: Tympanic membrane, ear canal and external ear normal.      Left Ear: Tympanic membrane, ear canal and external ear normal.      Nose: Nose normal.      Mouth/Throat:      Mouth: Mucous membranes are dry.      Pharynx: Oropharynx is clear.   Eyes:      Extraocular Movements: Extraocular movements intact.      Conjunctiva/sclera: Conjunctivae normal.      Pupils: Pupils are equal, round, and reactive to light.   Cardiovascular:      Rate and Rhythm: Normal rate and regular rhythm.      Pulses: Normal pulses.      Heart sounds: Normal heart sounds.   Pulmonary:      Effort: Pulmonary effort is normal.      Breath sounds: Normal breath sounds.   Musculoskeletal:      Cervical back: Normal range of motion and neck supple.   Feet:      Comments:      Neurological:      Mental Status: He is alert.      Comments: L leg radiculopathy, positive SLR on L at 60 degrees         Procedures      Assessment/Plan:   Diagnoses and all orders for this visit:    1. Acute left-sided low back pain with left-sided sciatica (Primary)  Assessment & Plan:  We are going to take him off his ibuprofen 800.  We can start him on meloxicam 15 mg once a day.  We are going get an LS spine.  We are to send him to physical therapy.  Return to clinic if worse.    Orders:  -     XR Spine Lumbar 2 or 3 View (In Office)  -     Ambulatory Referral to Physical  Therapy Evaluate and treat    Other orders  -     Meloxicam 15 MG tablet dispersible; Place 15 mg on the tongue Daily As Needed (pain).  Dispense: 30 tablet; Refill: 2           Follow Up:   No follow-ups on file.    Allen Weston MD  [facility]

## 2023-01-11 ENCOUNTER — TELEPHONE (OUTPATIENT)
Dept: FAMILY MEDICINE CLINIC | Facility: CLINIC | Age: 58
End: 2023-01-11
Payer: MEDICAID

## 2023-01-11 DIAGNOSIS — F43.21 GRIEF REACTION: ICD-10-CM

## 2023-01-11 RX ORDER — SERTRALINE HYDROCHLORIDE 100 MG/1
TABLET, FILM COATED ORAL
Qty: 30 TABLET | Refills: 0 | Status: SHIPPED | OUTPATIENT
Start: 2023-01-11 | End: 2023-01-23 | Stop reason: SDUPTHER

## 2023-01-11 RX ORDER — METHOCARBAMOL 750 MG/1
750 TABLET, FILM COATED ORAL 4 TIMES DAILY
Qty: 120 TABLET | Refills: 0 | Status: SHIPPED | OUTPATIENT
Start: 2023-01-11

## 2023-01-11 NOTE — TELEPHONE ENCOUNTER
Rx Refill Note    Requested Prescriptions     Pending Prescriptions Disp Refills   • sertraline (ZOLOFT) 100 MG tablet [Pharmacy Med Name: SERTRALINE  MG TABLET] 30 tablet 0     Sig: TAKE ONE TABLET BY MOUTH DAILY        Last office visit with prescribing clinician: 7/14/2022      Next office visit with prescribing clinician: Visit date not found   Last labs:   Last refill: 07/14/2022   Pharmacy (be sure to add in Epic). correct

## 2023-01-11 NOTE — TELEPHONE ENCOUNTER
Caller: WadeNilay ortiz    Relationship: Self    Best call back number: [phone]    Requested Prescriptions:   methocarbamol (ROBAXIN) 750 MG tablet     Pharmacy where request should be sent: [facility] [id number] - [place], [place] - [address] [facility] [address] - [phone]  - [phone] FX     Additional details provided by patient: PLEASE REFILL OR CALL TO ADVISE.     Does the patient have less than a 3 day supply:  [x] Yes  [] No    Would you like a call back once the refill request has been completed: [x] Yes [] No    If the office needs to give you a call back, can they leave a voicemail: [x] Yes [] No    Alycia Sanchez Rep   01/11/23 10:59 EST

## 2023-01-16 ENCOUNTER — LAB (OUTPATIENT)
Dept: FAMILY MEDICINE CLINIC | Facility: CLINIC | Age: 58
End: 2023-01-16
Payer: MEDICAID

## 2023-01-16 DIAGNOSIS — Z79.899 ENCOUNTER FOR LONG-TERM (CURRENT) USE OF OTHER MEDICATIONS: Primary | ICD-10-CM

## 2023-01-16 DIAGNOSIS — Z11.59 ENCOUNTER FOR HEPATITIS C SCREENING TEST FOR LOW RISK PATIENT: ICD-10-CM

## 2023-01-16 DIAGNOSIS — Z12.5 SPECIAL SCREENING FOR MALIGNANT NEOPLASM OF PROSTATE: ICD-10-CM

## 2023-01-16 PROCEDURE — 36415 COLL VENOUS BLD VENIPUNCTURE: CPT | Performed by: FAMILY MEDICINE

## 2023-01-17 LAB
ALBUMIN SERPL-MCNC: 4.2 G/DL (ref 3.8–4.9)
ALBUMIN/GLOB SERPL: 2.1 {RATIO} (ref 1.2–2.2)
ALP SERPL-CCNC: 101 IU/L (ref 44–121)
ALT SERPL-CCNC: 119 IU/L (ref 0–44)
AST SERPL-CCNC: 40 IU/L (ref 0–40)
BASOPHILS # BLD AUTO: 0.1 X10E3/UL (ref 0–0.2)
BASOPHILS NFR BLD AUTO: 1 %
BILIRUB SERPL-MCNC: 0.4 MG/DL (ref 0–1.2)
BUN SERPL-MCNC: 11 MG/DL (ref 6–24)
BUN/CREAT SERPL: 13 (ref 9–20)
CALCIUM SERPL-MCNC: 9.2 MG/DL (ref 8.7–10.2)
CHLORIDE SERPL-SCNC: 106 MMOL/L (ref 96–106)
CHOLEST SERPL-MCNC: 161 MG/DL (ref 100–199)
CK SERPL-CCNC: 63 U/L (ref 41–331)
CO2 SERPL-SCNC: 23 MMOL/L (ref 20–29)
CREAT SERPL-MCNC: 0.85 MG/DL (ref 0.76–1.27)
EGFRCR SERPLBLD CKD-EPI 2021: 101 ML/MIN/1.73
EOSINOPHIL # BLD AUTO: 0.5 X10E3/UL (ref 0–0.4)
EOSINOPHIL NFR BLD AUTO: 6 %
ERYTHROCYTE [DISTWIDTH] IN BLOOD BY AUTOMATED COUNT: 14.1 % (ref 11.6–15.4)
GLOBULIN SER CALC-MCNC: 2 G/DL (ref 1.5–4.5)
GLUCOSE SERPL-MCNC: 167 MG/DL (ref 70–99)
HBA1C MFR BLD: 5.7 % (ref 4.8–5.6)
HCT VFR BLD AUTO: 48.9 % (ref 37.5–51)
HCV AB S/CO SERPL IA: >11 S/CO RATIO (ref 0–0.9)
HDLC SERPL-MCNC: 48 MG/DL
HGB BLD-MCNC: 16.2 G/DL (ref 13–17.7)
IMM GRANULOCYTES # BLD AUTO: 0 X10E3/UL (ref 0–0.1)
IMM GRANULOCYTES NFR BLD AUTO: 1 %
LDLC SERPL CALC-MCNC: 91 MG/DL (ref 0–99)
LYMPHOCYTES # BLD AUTO: 2 X10E3/UL (ref 0.7–3.1)
LYMPHOCYTES NFR BLD AUTO: 27 %
MCH RBC QN AUTO: 30.5 PG (ref 26.6–33)
MCHC RBC AUTO-ENTMCNC: 33.1 G/DL (ref 31.5–35.7)
MCV RBC AUTO: 92 FL (ref 79–97)
MONOCYTES # BLD AUTO: 0.9 X10E3/UL (ref 0.1–0.9)
MONOCYTES NFR BLD AUTO: 12 %
NEUTROPHILS # BLD AUTO: 3.7 X10E3/UL (ref 1.4–7)
NEUTROPHILS NFR BLD AUTO: 53 %
PLATELET # BLD AUTO: 179 X10E3/UL (ref 150–450)
POTASSIUM SERPL-SCNC: 5 MMOL/L (ref 3.5–5.2)
PROT SERPL-MCNC: 6.2 G/DL (ref 6–8.5)
PSA SERPL-MCNC: 0.3 NG/ML (ref 0–4)
RBC # BLD AUTO: 5.31 X10E6/UL (ref 4.14–5.8)
SODIUM SERPL-SCNC: 143 MMOL/L (ref 134–144)
TRIGL SERPL-MCNC: 121 MG/DL (ref 0–149)
TSH SERPL DL<=0.005 MIU/L-ACNC: 1.44 UIU/ML (ref 0.45–4.5)
VLDLC SERPL CALC-MCNC: 22 MG/DL (ref 5–40)
WBC # BLD AUTO: 7.2 X10E3/UL (ref 3.4–10.8)

## 2023-01-23 ENCOUNTER — OFFICE VISIT (OUTPATIENT)
Dept: FAMILY MEDICINE CLINIC | Facility: CLINIC | Age: 58
End: 2023-01-23
Payer: MEDICAID

## 2023-01-23 VITALS
SYSTOLIC BLOOD PRESSURE: 122 MMHG | OXYGEN SATURATION: 100 % | DIASTOLIC BLOOD PRESSURE: 80 MMHG | RESPIRATION RATE: 15 BRPM | TEMPERATURE: 97.1 F | HEART RATE: 113 BPM | BODY MASS INDEX: 30.61 KG/M2 | HEIGHT: 72 IN | WEIGHT: 226 LBS

## 2023-01-23 DIAGNOSIS — F43.21 GRIEF REACTION: ICD-10-CM

## 2023-01-23 DIAGNOSIS — R73.9 HYPERGLYCEMIA: ICD-10-CM

## 2023-01-23 DIAGNOSIS — Z00.00 ANNUAL PHYSICAL EXAM: Primary | ICD-10-CM

## 2023-01-23 DIAGNOSIS — B19.20 HEPATITIS C VIRUS INFECTION WITHOUT HEPATIC COMA, UNSPECIFIED CHRONICITY: ICD-10-CM

## 2023-01-23 PROCEDURE — 99396 PREV VISIT EST AGE 40-64: CPT | Performed by: FAMILY MEDICINE

## 2023-01-23 RX ORDER — SERTRALINE HYDROCHLORIDE 100 MG/1
100 TABLET, FILM COATED ORAL DAILY
Qty: 90 TABLET | Refills: 3 | Status: SHIPPED | OUTPATIENT
Start: 2023-01-23

## 2023-01-23 RX ORDER — METHOCARBAMOL 750 MG/1
750 TABLET, FILM COATED ORAL 4 TIMES DAILY
Qty: 120 TABLET | Refills: 0 | Status: CANCELLED | OUTPATIENT
Start: 2023-01-23

## 2023-01-23 NOTE — PROGRESS NOTES
Male Physical Note      Patient Name: Nilay Olvera  : 1965   MRN: [id number]     Chief Complaint:    Chief Complaint   Patient presents with   • Annual Exam   • lab test results       History of Present Illness: Nilay Olvera is a 57 y.o. male who is here today for their annual health maintenance and physical.  He also relates that he did not know he was supposed to go for the x-ray has not yet done that he now is getting a little bit of pain down the buttock and down his leg.  Otherwise he is doing well is here to go over his blood work said he is never had hepatitis C he is never used IV drugs or had a blood transfusion.    Needs Zoloft refilled he stable on this.  He says he is lost 100 pounds and is going to work little bit better on his diet used to weigh about 319 pounds.    Review of Systems   Constitutional: Negative for fatigue and fever.   HENT: Negative for ear pain and sore throat.    Eyes: Negative for visual disturbance.   Respiratory: Negative for cough, chest tightness and shortness of breath.    Cardiovascular: Negative for chest pain and palpitations.   Gastrointestinal: Negative for abdominal pain, blood in stool, melena, constipation, diarrhea, nausea and vomiting.   Endocrine: Negative for cold intolerance and heat intolerance.   Genitourinary: Negative for dysuria and hematuria.   Musculoskeletal: Negative for back pain and joint swelling.   Skin: Negative for rash and wound.   Allergic/Immunologic: Negative for environmental allergies and food allergies.         Subjective      Review of Systems:   Review of Systems    Past Medical History, Social History, Family History and Care Team were all reviewed with patient and updated as appropriate.     Medications:     Current Outpatient Medications:   •  sertraline (ZOLOFT) 100 MG tablet, Take 1 tablet by mouth Daily., Disp: 90 tablet, Rfl: 3  •  Meloxicam 15 MG tablet dispersible, Place 15 mg on the tongue Daily As Needed  "(pain)., Disp: 30 tablet, Rfl: 2  •  methocarbamol (ROBAXIN) 750 MG tablet, Take 1 tablet by mouth 4 (Four) Times a Day., Disp: 120 tablet, Rfl: 0    Allergies:   No Known Allergies    I  CT for Smoker (Age 55-75, 30pk yr): N/A      Depression: PHQ-2 Depression Screening  PHQ-9 Total Score:         Intimate partner violence: (Screen on initial visit, older adult with injury or evidence of neglect):  • Violence can be a problem in many people's lives, so I now ask every patient about trauma or abuse they may have experienced in a relationship.  • Stress/Safety - Do you feel safe in your relationship?  • Afraid/Abused - Have you ever been in a relationship where you were threatened, hurt, or afraid?  • Friend/Family - Are your friends aware you have been hurt?  • Emergency Plan - Do you have a safe place to go and the resources you need in an emergency?    Osteoporosis:   • Men: history of low trauma fracture, androgen deprivation therapy for prostate cancer, hypogonadism, primary hyperparathyroidism, intestinal disorders.     Objective     Physical Exam:  Vital Signs:   Vitals:    01/23/23 0802   BP: 122/80   BP Location: Right arm   Patient Position: Sitting   Cuff Size: Large Adult   Pulse: 113   Resp: 15   Temp: 97.1 °F (36.2 °C)   TempSrc: Infrared   SpO2: 100%   Weight: 103 kg (226 lb)   Height: 181.6 cm (71.5\")   PainSc: 0-No pain     Body mass index is 31.08 kg/m².        Physical Exam  Vitals and nursing note reviewed.   Constitutional:       General: He is not in acute distress.     Appearance: Normal appearance. He is obese.   HENT:      Head: Normocephalic and atraumatic.      Right Ear: Tympanic membrane, ear canal and external ear normal.      Left Ear: Tympanic membrane, ear canal and external ear normal.      Nose: Nose normal.      Mouth/Throat:      Mouth: Mucous membranes are dry.      Pharynx: Oropharynx is clear.      Comments: Edentulous  Says he uses lemon drops to help keep his mouth moist says " has been dry since he pulled his teeth.  Eyes:      Extraocular Movements: Extraocular movements intact.      Conjunctiva/sclera: Conjunctivae normal.      Pupils: Pupils are equal, round, and reactive to light.   Neck:      Thyroid: No thyroid mass or thyroid tenderness.   Cardiovascular:      Rate and Rhythm: Normal rate and regular rhythm.      Heart sounds: Normal heart sounds.      Comments: RADIAL PULSES NML  Pulmonary:      Effort: Pulmonary effort is normal.      Breath sounds: Normal breath sounds.   Abdominal:      General: Abdomen is flat. Bowel sounds are normal.      Palpations: Abdomen is soft. There is no mass.      Tenderness: There is no abdominal tenderness. There is no guarding or rebound.   Musculoskeletal:      Cervical back: Normal range of motion and neck supple.      Right lower leg: No edema.      Left lower leg: No edema.      Comments: Good straight leg raise in seated position   Lymphadenopathy:      Cervical: No cervical adenopathy.   Skin:     General: Skin is warm and dry.      Findings: No rash.   Neurological:      General: No focal deficit present.      Mental Status: He is alert and oriented to person, place, and time.      Cranial Nerves: No cranial nerve deficit.      Sensory: Sensation is intact.      Motor: Motor function is intact.      Deep Tendon Reflexes: Reflexes normal.      Comments: SYMMETRIC PATELLAR REFLEXES   Psychiatric:         Attention and Perception: Attention normal.         Mood and Affect: Mood normal.         Behavior: Behavior normal.         Thought Content: Thought content normal.         Judgment: Judgment normal.         Procedures    Assessment / Plan      Assessment/Plan:   Diagnoses and all orders for this visit:    1. Annual physical exam (Primary)    2. Grief reaction  -     sertraline (ZOLOFT) 100 MG tablet; Take 1 tablet by mouth Daily.  Dispense: 90 tablet; Refill: 3    3. Hepatitis C virus infection without hepatic coma, unspecified  chronicity  -     Ambulatory Referral to Gastroenterology    4. Hyperglycemia  -     Comprehensive Metabolic Panel; Future  -     Hemoglobin A1c; Future       Continue Zoloft and his other meds as directed    For his hyperglycemia he is going to work on diet avoid concentrated sweets cut back on carbs and walk 30 to 60 minutes 5 days a week.    For his positive hep C antibody will refer to GI for work-up he does have slightly elevated liver enzyme on ALT as well.    He just had a colonoscopy a few months ago and says he is told to follow-up in 10 years he had 1 polyp which was benign.  Dr. Ac will try to get those results    Says he had his tetanus [facility] a couple 2 or 3 years ago [facility] so we will try to get that record as well.    He declines flu shot says he never gets that.  BMI is >= 30 and <35. (Class 1 Obesity). The following options were offered after discussion;: exercise counseling/recommendations and nutrition counseling/recommendations      Follow Up:   Return in about 6 months (around 7/23/2023) for Recheck, Labs prior next visit.    Healthcare Maintenance:   Nilay Olvera voices understanding and acceptance of this advice and will call back with any further questions or concerns. AVS with preventive healthcare tips printed for patient.         Levi Mcrae MD  [facility]  Portions of note created with Dragon voice recognition technology

## 2023-01-23 NOTE — PATIENT INSTRUCTIONS

## 2023-04-27 ENCOUNTER — LAB (OUTPATIENT)
Dept: FAMILY MEDICINE CLINIC | Facility: CLINIC | Age: 58
End: 2023-04-27
Payer: MEDICAID

## 2023-04-27 ENCOUNTER — OFFICE VISIT (OUTPATIENT)
Dept: GASTROENTEROLOGY | Facility: CLINIC | Age: 58
End: 2023-04-27
Payer: MEDICAID

## 2023-04-27 VITALS
HEART RATE: 88 BPM | OXYGEN SATURATION: 98 % | TEMPERATURE: 97.7 F | SYSTOLIC BLOOD PRESSURE: 136 MMHG | DIASTOLIC BLOOD PRESSURE: 88 MMHG

## 2023-04-27 DIAGNOSIS — B18.2 CHRONIC HEPATITIS C WITHOUT HEPATIC COMA: Primary | ICD-10-CM

## 2023-04-27 DIAGNOSIS — B18.2 CHRONIC HEPATITIS C WITHOUT HEPATIC COMA: ICD-10-CM

## 2023-04-27 NOTE — PROGRESS NOTES
New Patient Consultation     Patient Name: Nilay Olvera  : 1965   MRN: [id number]     Chief Complaint:  No chief complaint on file.      History of Present Illness: Nilay Olvera is a 57 y.o. male, PMH includes depression, who is here today for a Gastroenterology Consultation for chronic HCV.     Pt is unsure how he contracted HCV, or when he might have done so. He has one tattoo done in professional setting, few ear piercings also done in professional setting. He denies previous blood transfusions or remote h/o dental work. He is unsure of contact with HCV (+) sexual partners.     Patient denies associated fever, chills, abdominal pain, indigestion, nausea, vomiting, diarrhea, constipation, hematemesis, dysphagia, hematochezia, melena, bloating, weight loss or gain, dysuria, jaundice or bruising.    Patient denies personal or FHx of PUD, H Pylori, gastritis, pancreatitis, colitis, Celiac disease, UC, Crohn's disease, IBS, colon or gastric cancers. Pt denies EtOH, tobacco or NSAID use. He smokes marijuana daily.     CSY within the last 1-2 years with Dr. Moreno in [place]. Few benign polyps removed. Recommend repeat CSY in 5 years.    Subjective      Review of Systems:   Review of Systems   Constitutional: Negative for appetite change, chills, diaphoresis, fatigue, fever, unexpected weight gain and unexpected weight loss.   HENT: Negative for drooling, facial swelling, mouth sores, nosebleeds, rhinorrhea, sore throat, swollen glands, tinnitus and trouble swallowing.    Eyes: Negative.    Respiratory: Negative for choking, chest tightness and shortness of breath.    Gastrointestinal: Negative for abdominal pain, anal bleeding, blood in stool, constipation, diarrhea, nausea, vomiting, GERD and indigestion.   Endocrine: Negative.    Genitourinary: Negative for dysuria, flank pain and hematuria.   Musculoskeletal: Negative for arthralgias, back pain, myalgias and neck pain.   Skin: Negative for  color change, dry skin, pallor and bruise.   Neurological: Negative for dizziness, tremors, syncope, weakness and numbness.   Psychiatric/Behavioral: Negative for decreased concentration, hallucinations and self-injury. The patient is not nervous/anxious.    All other systems reviewed and are negative.      Past Medical History:   Past Medical History:   Diagnosis Date   • Depression        Past Surgical History:   Past Surgical History:   Procedure Laterality Date   • LEG SURGERY Left     Left lower leg repair/pins placed   • WRIST FRACTURE SURGERY Right     plastic plate replaced broken bone       Family History:   Family History   Problem Relation Age of Onset   • No Known Problems Mother    • No Known Problems Father    • No Known Problems Sister    • No Known Problems Brother    • No Known Problems Maternal Grandmother    • No Known Problems Maternal Grandfather    • No Known Problems Paternal Grandmother    • No Known Problems Paternal Grandfather        Social History:   Social History     Socioeconomic History   • Marital status:    Tobacco Use   • Smoking status: Former     Packs/day: 1.00     Years: 44.00     Pack years: 44.00     Types: Cigarettes     Start date: 1977     Quit date:      Years since quittin.3   • Smokeless tobacco: Never   Vaping Use   • Vaping Use: Never used   Substance and Sexual Activity   • Alcohol use: Never   • Drug use: Yes     Types: Marijuana   • Sexual activity: Defer       Alcohol/Tobacco History:   Social History     Substance and Sexual Activity   Alcohol Use Never     Social History     Tobacco Use   Smoking Status Former   • Packs/day: 1.00   • Years: 44.00   • Pack years: 44.00   • Types: Cigarettes   • Start date: 1977   • Quit date:    • Years since quittin.3   Smokeless Tobacco Never       Medications:     Current Outpatient Medications:   •  sertraline (ZOLOFT) 100 MG tablet, Take 1 tablet by mouth Daily., Disp: 90 tablet, Rfl: 3  •   Meloxicam 15 MG tablet dispersible, Place 15 mg on the tongue Daily As Needed (pain). (Patient not taking: Reported on 4/27/2023), Disp: 30 tablet, Rfl: 2  •  methocarbamol (ROBAXIN) 750 MG tablet, Take 1 tablet by mouth 4 (Four) Times a Day. (Patient not taking: Reported on 4/27/2023), Disp: 120 tablet, Rfl: 0    Allergies:   No Known Allergies    Objective     Physical Exam:  Vital Signs: There were no vitals filed for this visit.  There is no height or weight on file to calculate BMI.     Physical Exam  Vitals and nursing note reviewed.   Constitutional:       General: He is not in acute distress.     Appearance: Normal appearance. He is not ill-appearing or diaphoretic.   HENT:      Head: Normocephalic and atraumatic.      Right Ear: External ear normal.      Left Ear: External ear normal.      Nose: Nose normal.      Mouth/Throat:      Mouth: Mucous membranes are moist.      Pharynx: Oropharynx is clear.      Comments: Edentelous  Eyes:      Conjunctiva/sclera: Conjunctivae normal.      Pupils: Pupils are equal, round, and reactive to light.   Cardiovascular:      Rate and Rhythm: Normal rate and regular rhythm.      Pulses: Normal pulses.      Heart sounds: Normal heart sounds.   Pulmonary:      Effort: Pulmonary effort is normal.      Breath sounds: Normal breath sounds.   Abdominal:      General: Abdomen is flat. There is no distension.      Tenderness: There is no abdominal tenderness. There is no guarding or rebound.   Musculoskeletal:         General: Normal range of motion.      Cervical back: Normal range of motion and neck supple.   Skin:     General: Skin is warm and dry.   Neurological:      General: No focal deficit present.      Mental Status: He is alert and oriented to person, place, and time.   Psychiatric:         Mood and Affect: Mood normal.         Assessment / Plan      Assessment/Plan:   There are no diagnoses linked to this encounter.     Chronic HCV   - will initiate Epclusa therapy,  pending workup below   - obtain CBC, CMP, HCV quant and genotype, Hep A/B serologies, HCV fibrosure, PT/INR, iron profile, ferritin, UDS   - obtain liver US   - follow up in clinic in 2mo, or after completion of above studies   - call clinic at any time for questions or new / worsened sx    Follow Up:   Return in about 2 months (around 6/27/2023).    Plan of care reviewed with the patient at the conclusion of today's visit.  Education was provided regarding diagnosis, management, and any prescribed or recommended OTC medications.  Patient verbalized understanding of and agreement with management plan.     NOTE TO PATIENT: The 21st Century Cures Act makes medical notes like these available to patients in the interest of transparency. However, be advised this is a medical document. It is intended as peer to peer communication. It is written in medical language and may contain abbreviations or verbiage that are unfamiliar. It may appear blunt or direct. Medical documents are intended to carry relevant information, facts as evident, and the clinical opinion of the practitioner.     Time Statement:   Discussed plan of care in detail with patient today. Patient verbally understands and agrees. I have spent 45 minutes reviewing available diagnostics, obtaining history, examining the patient, developing a treatment plan, and educating the patient on disease process and plan of care.    Emilie Sawyer PA-C   [facility]

## 2023-04-28 LAB
ALBUMIN SERPL-MCNC: 4.2 G/DL (ref 3.8–4.9)
ALBUMIN/GLOB SERPL: 1.6 {RATIO} (ref 1.2–2.2)
ALP SERPL-CCNC: 94 IU/L (ref 44–121)
ALT SERPL-CCNC: 33 IU/L (ref 0–44)
AMPHETAMINES UR QL SCN: NEGATIVE NG/ML
AST SERPL-CCNC: 32 IU/L (ref 0–40)
BARBITURATES UR QL SCN: NEGATIVE NG/ML
BASOPHILS # BLD AUTO: 0.1 X10E3/UL (ref 0–0.2)
BASOPHILS NFR BLD AUTO: 1 %
BENZODIAZ UR QL SCN: NEGATIVE NG/ML
BILIRUB SERPL-MCNC: 0.3 MG/DL (ref 0–1.2)
BUN SERPL-MCNC: 12 MG/DL (ref 6–24)
BUN/CREAT SERPL: 16 (ref 9–20)
BZE UR QL SCN: NEGATIVE NG/ML
CALCIUM SERPL-MCNC: 9.3 MG/DL (ref 8.7–10.2)
CANNABINOIDS UR QL SCN: POSITIVE NG/ML
CHLORIDE SERPL-SCNC: 104 MMOL/L (ref 96–106)
CO2 SERPL-SCNC: 23 MMOL/L (ref 20–29)
CREAT SERPL-MCNC: 0.73 MG/DL (ref 0.76–1.27)
CREAT UR-MCNC: 126 MG/DL (ref 20–300)
EGFRCR SERPLBLD CKD-EPI 2021: 106 ML/MIN/1.73
EOSINOPHIL # BLD AUTO: 0.2 X10E3/UL (ref 0–0.4)
EOSINOPHIL NFR BLD AUTO: 3 %
ERYTHROCYTE [DISTWIDTH] IN BLOOD BY AUTOMATED COUNT: 13 % (ref 11.6–15.4)
FERRITIN SERPL-MCNC: 526 NG/ML (ref 30–400)
GLOBULIN SER CALC-MCNC: 2.6 G/DL (ref 1.5–4.5)
GLUCOSE SERPL-MCNC: 90 MG/DL (ref 70–99)
HAV AB SER QL IA: POSITIVE
HBV SURFACE AB SER QL: NON REACTIVE
HBV SURFACE AG SERPL QL IA: NEGATIVE
HCT VFR BLD AUTO: 46.1 % (ref 37.5–51)
HGB BLD-MCNC: 15.7 G/DL (ref 13–17.7)
IMM GRANULOCYTES # BLD AUTO: 0 X10E3/UL (ref 0–0.1)
IMM GRANULOCYTES NFR BLD AUTO: 1 %
INR PPP: 1 (ref 0.9–1.2)
IRON SATN MFR SERPL: 22 % (ref 15–55)
IRON SERPL-MCNC: 85 UG/DL (ref 38–169)
LABORATORY COMMENT REPORT: ABNORMAL
LYMPHOCYTES # BLD AUTO: 2 X10E3/UL (ref 0.7–3.1)
LYMPHOCYTES NFR BLD AUTO: 26 %
MCH RBC QN AUTO: 31.5 PG (ref 26.6–33)
MCHC RBC AUTO-ENTMCNC: 34.1 G/DL (ref 31.5–35.7)
MCV RBC AUTO: 92 FL (ref 79–97)
METHADONE UR QL SCN: NEGATIVE NG/ML
MONOCYTES # BLD AUTO: 0.7 X10E3/UL (ref 0.1–0.9)
MONOCYTES NFR BLD AUTO: 9 %
NEUTROPHILS # BLD AUTO: 4.6 X10E3/UL (ref 1.4–7)
NEUTROPHILS NFR BLD AUTO: 60 %
OPIATES UR QL SCN: NEGATIVE NG/ML
OXYCODONE+OXYMORPHONE UR QL SCN: NEGATIVE NG/ML
PCP UR QL: NEGATIVE NG/ML
PH UR: 5.6 [PH] (ref 4.5–8.9)
PLATELET # BLD AUTO: 240 X10E3/UL (ref 150–450)
POTASSIUM SERPL-SCNC: 4.6 MMOL/L (ref 3.5–5.2)
PROPOXYPH UR QL SCN: NEGATIVE NG/ML
PROT SERPL-MCNC: 6.8 G/DL (ref 6–8.5)
PROTHROMBIN TIME: 10.7 SEC (ref 9.1–12)
RBC # BLD AUTO: 4.99 X10E6/UL (ref 4.14–5.8)
SODIUM SERPL-SCNC: 143 MMOL/L (ref 134–144)
TIBC SERPL-MCNC: 379 UG/DL (ref 250–450)
UIBC SERPL-MCNC: 294 UG/DL (ref 111–343)
WBC # BLD AUTO: 7.6 X10E3/UL (ref 3.4–10.8)

## 2023-04-30 LAB
HCV GENTYP SERPL NAA+PROBE: NORMAL
LABORATORY COMMENT REPORT: NORMAL

## 2023-05-01 LAB
HCV RNA SERPL NAA+PROBE-ACNC: NORMAL IU/ML
HCV RNA SERPL NAA+PROBE-LOG IU: 5.49 LOG10 IU/ML
TEST INFORMATION: NORMAL

## 2023-05-03 LAB
A2 MACROGLOB SERPL-MCNC: 293 MG/DL (ref 110–276)
ALT SERPL W P-5'-P-CCNC: 40 IU/L (ref 0–55)
APO A-I SERPL-MCNC: 130 MG/DL (ref 101–178)
BILIRUB SERPL-MCNC: 0.3 MG/DL (ref 0–1.2)
FIBROSIS SCORING:: ABNORMAL
FIBROSIS STAGE SERPL QL: ABNORMAL
GGT SERPL-CCNC: 26 IU/L (ref 0–65)
HAPTOGLOB SERPL-MCNC: 96 MG/DL (ref 29–370)
HCV AB SER QL: ABNORMAL
LABORATORY COMMENT REPORT: ABNORMAL
LIVER FIBR SCORE SERPL CALC.FIBROSURE: 0.41 (ref 0–0.21)
NECROINFLAMM ACTIVITY SCORING:: ABNORMAL
NECROINFLAMMATORY ACT GRADE SERPL QL: ABNORMAL
NECROINFLAMMATORY ACT SCORE SERPL: 0.25 (ref 0–0.17)
SERVICE CMNT-IMP: ABNORMAL
TEST PERFORMANCE INFO SPEC: ABNORMAL

## 2023-05-04 ENCOUNTER — SPECIALTY PHARMACY (OUTPATIENT)
Dept: PHARMACY | Facility: TELEHEALTH | Age: 58
End: 2023-05-04
Payer: MEDICAID

## 2023-05-04 DIAGNOSIS — B18.2 CHRONIC HEPATITIS C WITHOUT HEPATIC COMA: Primary | ICD-10-CM

## 2023-05-04 DIAGNOSIS — J30.9 ALLERGIC RHINITIS, UNSPECIFIED SEASONALITY, UNSPECIFIED TRIGGER: Primary | ICD-10-CM

## 2023-05-04 RX ORDER — VELPATASVIR AND SOFOSBUVIR 100; 400 MG/1; MG/1
1 TABLET, FILM COATED ORAL DAILY
Qty: 28 TABLET | Refills: 2 | Status: SHIPPED | OUTPATIENT
Start: 2023-05-04

## 2023-05-04 RX ORDER — FEXOFENADINE HCL 180 MG/1
180 TABLET ORAL DAILY
Qty: 90 TABLET | Refills: 1 | Status: SHIPPED | OUTPATIENT
Start: 2023-05-04

## 2023-05-05 ENCOUNTER — DOCUMENTATION (OUTPATIENT)
Dept: PHARMACY | Facility: TELEHEALTH | Age: 58
End: 2023-05-05
Payer: MEDICAID

## 2023-05-05 ENCOUNTER — SPECIALTY PHARMACY (OUTPATIENT)
Dept: PHARMACY | Facility: TELEHEALTH | Age: 58
End: 2023-05-05
Payer: MEDICAID

## 2023-05-05 NOTE — PROGRESS NOTES
Specialty Pharmacy Patient Management Program  Initial Assessment     Nilay Olvera is a 57 y.o. male with hepatitis c and enrolled in the Patient Management program offered by [facility]. An initial outreach was conducted, including assessment of therapy appropriateness and specialty medication education for Epclusa 400-100. The patient was introduced to services offered by [facility], including: regular assessments, refill coordination, curbside pick-up or mail order delivery options, prior authorization maintenance, and financial assistance programs as applicable. The patient was also provided with contact information for the pharmacy team.     Insurance Coverage & Financial Support  [place] through medicaid     Relevant Past Medical History and Comorbidities  Relevant medical history and concomitant health conditions were discussed with the patient. The patient's chart has been reviewed for relevant past medical history and comorbid health conditions and updated as necessary.   Past Medical History:   Diagnosis Date   • Depression      Social History     Socioeconomic History   • Marital status:    Tobacco Use   • Smoking status: Former     Packs/day: 1.00     Years: 44.00     Pack years: 44.00     Types: Cigarettes     Start date: 1977     Quit date:      Years since quittin.3   • Smokeless tobacco: Never   Vaping Use   • Vaping Use: Never used   Substance and Sexual Activity   • Alcohol use: Never   • Drug use: Yes     Types: Marijuana   • Sexual activity: Defer       Allergies  Known allergies and reactions were discussed with the patient. The patient's chart has been reviewed for allergy information and updated as necessary.   Patient has no known allergies.    Current Medication List  This medication list has been reviewed with the patient and evaluated for any interactions or necessary modifications/recommendations, and updated to include  all prescription medications, OTC medications, and supplements the patient is currently taking. This list reflects what is contained in the patient's profile, which has also been marked as reviewed to communicate to other providers it is the most up to date version of the patient's current medication therapy.     Current Outpatient Medications:   •  fexofenadine (Allegra Allergy) 180 MG tablet, Take 1 tablet by mouth Daily. As needed for allergy symptoms, Disp: 90 tablet, Rfl: 1  •  sertraline (ZOLOFT) 100 MG tablet, Take 1 tablet by mouth Daily., Disp: 90 tablet, Rfl: 3  •  Sofosbuvir-Velpatasvir (Epclusa) 400-100 MG tablet, Take 1 tablet by mouth Daily for 28 days., Disp: 28 tablet, Rfl: 2    Drug Interactions  none     Relevant Laboratory Values  Lab Results   Component Value Date    GLUCOSE 90 04/27/2023    CALCIUM 9.3 04/27/2023     04/27/2023    K 4.6 04/27/2023    CO2 23 04/27/2023     04/27/2023    BUN 12 04/27/2023    CREATININE 0.73 (L) 04/27/2023    EGFRRESULT 106 04/27/2023    BCR 16 04/27/2023     Lab Results   Component Value Date    WBC 7.6 04/27/2023    HGB 15.7 04/27/2023    HCT 46.1 04/27/2023    MCV 92 04/27/2023     04/27/2023    INR 1.0 04/27/2023       Initial Education Provided for Specialty Medication  The patient has been provided with the following education and any applicable administration techniques (i.e. self-injection) have been demonstrated for the therapies indicated. All questions and concerns have been addressed prior to the patient receiving the medication, and the patient has verbalized understanding of the education and any materials provided. Additional patient education shall be provided and documented upon request by the patient, provider or payer.        Epclusa (sofosbuvir/velpatasvir)         Medication Expectations   Why am I taking this medication? This is indicated for patients with hepatitis C virus (HCV) genotypes 1-6 without cirrhosis, genotypes 1,  2, 4-6 with compensated cirrhosis, or genotype 3 with compensated cirrhosis if KACI Y93H is negative. It is also indicated for patients with genotypes 1-6 with decompensated cirrhosis, however this will require longer treatment and/or the addition of ribavirin.   What should I expect while on this medication? There is a > 90% cure rate of hepatitis C with completed treatment.   How does the medication work? Sofosbuvir is an inhibitor of HCV NS5B protease, necessary for replication of the virus.    Velpatasvir is an inhibitor of HCV NS5A, essential for viral replication and assembly.   How long will I be on this medication for? You will be on this medication for 12 weeks.   How do I take this medication? Sofosbuvir/velpatasvir is 1 tablet taken at the same time each day with or without food.   What are some possible side effects? The most common side effects are headache, fatigue, nausea, insomnia, and common cold symptoms.   What happens if I miss a dose? If it has been less than 18 hours, take the missed dose as soon as you can. Take your next dose at the usual time.  If it has been more than 18 hours, do not take your missed dose, take your next dose as usual.            Medication Safety   What are things I should warn my doctor immediately about? Allergic reaction (itching or hives, swelling in your face or hands, swelling or tingling in your mouth or throat, chest tightness, or trouble breathing); signs of liver failure including dark urine, pale stools, nausea, vomiting, loss of appetite, stomach pain, yellow skin or eyes.   What are things that I should be cautious of? Headache, nausea, tiredness or weakness.   What are some medications that can interact with this one? Some medicines can affect how sofosbuvir/velpatasvir works. Tell your doctor if you are using any of the following:  • Rifampin, rifabutin, rifapentin, carbamazepine, oxcarbazepine, phenytoin, phenobarbital, Osmel's wort, or amiodarone    • Medicine to treat HIV infection (including tipranavir, efavirenz, or ritonavir)  If you are taking a PPI, your therapy may need to be temporarily discontinued. If PPI therapy is necessary, omeprazole 20 mg is preferred and should be taken at least 4 hours after sofosbuvir/velpatasvir.            Medication Storage/Handling   How should I handle this medication? Keep this medication out of reach of pets/children in original container.     How does this medication need to be stored? Store at room temperature.   How should I dispose of this medication? You should not have any medication left over. If you do reach out to your pharmacist of doctor.            Resources/Support   How can I remind myself to take this medication? You can download a reminder concepcion on your phone or use a calendar to help with your once daily reminder.   Is financial support available?  Yes, [employer] can provide co-pay cards if you have commercial insurance or patient assistance if you have Medicare or no insurance.    Which vaccines are recommended for me? Talk with your doctor about the hepatitis B vaccine.           Adherence and Self-Administration  • Barriers to Patient Adherence and/or Self-Administration: none   • Methods for Supporting Patient Adherence and/or Self-Administration: N/A     Goals of Therapy   Goals     • Specialty Pharmacy General Goal      Do not miss any doses  Sustained virologic response 12 weeks post-treatment (SVR12)             Reassessment Plan & Follow-Up  1. Medication Therapy Changes: starting epclusa 400-100 1 tablet every day for 12 weeks  2. Additional Plans, Therapy Recommendations, or Therapy Problems to Be Addressed: none   3. Pharmacist to perform regular reassessments no more than (6) months from the previous assessment.  4. Welcome information and patient satisfaction survey to be sent by retail team with patient's initial fill.  5. Care Coordinator to set up future refill outreaches, coordinate  prescription delivery, and escalate clinical questions to pharmacist.     Attestation  I attest that the initiated specialty medication(s) are appropriate for the patient based on my assessment. If the prescribed therapy is at any point deemed not appropriate based on the current or future assessments, a consultation will be initiated with the patient's specialty care provider to determine the best course of action. The revised plan of therapy will be documented along with any additional patient education provided.     Electronically signed by Chance Simental RPH, 05/05/23, 11:16 AM EDT.

## 2023-05-25 ENCOUNTER — HOSPITAL ENCOUNTER (OUTPATIENT)
Dept: ULTRASOUND IMAGING | Facility: HOSPITAL | Age: 58
Discharge: HOME | End: 2023-05-25
Admitting: PHYSICIAN ASSISTANT
Payer: COMMERCIAL

## 2023-05-25 DIAGNOSIS — B18.2 CHRONIC HEPATITIS C WITHOUT HEPATIC COMA: ICD-10-CM

## 2023-05-25 PROCEDURE — 76705 ECHO EXAM OF ABDOMEN: CPT

## 2023-05-30 ENCOUNTER — SPECIALTY PHARMACY (OUTPATIENT)
Dept: PHARMACY | Facility: TELEHEALTH | Age: 58
End: 2023-05-30

## 2023-05-30 NOTE — PROGRESS NOTES
Specialty Pharmacy Patient Management Program  Hepatitis C Reassessment     Nilay Olvera is a 57 y.o. male with Hepatitis C and enrolled in the Patient Management program offered by [facility]. A follow-up outreach was conducted, including assessment of continued therapy appropriateness, medication adherence, and side effect incidence and management for Epclusa (sofosbuvir/velpatasvir).     Changes to Insurance Coverage or Financial Support  none    Relevant Past Medical History and Comorbidities  Relevant medical history and concomitant health conditions were discussed with the patient. The patient's chart has been reviewed for relevant past medical history and comorbid health conditions and updated as necessary.   Past Medical History:   Diagnosis Date   • Depression      Social History     Socioeconomic History   • Marital status:    Tobacco Use   • Smoking status: Former     Packs/day: 1.00     Years: 44.00     Pack years: 44.00     Types: Cigarettes     Start date: 1977     Quit date:      Years since quittin.4   • Smokeless tobacco: Never   Vaping Use   • Vaping Use: Never used   Substance and Sexual Activity   • Alcohol use: Never   • Drug use: Yes     Types: Marijuana   • Sexual activity: Defer       Allergies  Known allergies and reactions were discussed with the patient. The patient's chart has been reviewed for allergy information and updated as necessary.   Patient has no known allergies.    Current Medication List  This medication list has been reviewed with the patient and evaluated for any interactions or necessary modifications/recommendations, and updated to include all prescription medications, OTC medications, and supplements the patient is currently taking. This list reflects what is contained in the patient's profile, which has also been marked as reviewed to communicate to other providers it is the most up to date version of the patient's  current medication therapy.     Current Outpatient Medications:   •  fexofenadine (Allegra Allergy) 180 MG tablet, Take 1 tablet by mouth Daily. As needed for allergy symptoms, Disp: 90 tablet, Rfl: 1  •  sertraline (ZOLOFT) 100 MG tablet, Take 1 tablet by mouth Daily., Disp: 90 tablet, Rfl: 3  •  Sofosbuvir-Velpatasvir (Epclusa) 400-100 MG tablet, Take 1 tablet by mouth Daily., Disp: 28 tablet, Rfl: 2    Drug Interactions  none     Adverse Drug Reactions  • Adverse Reactions Experienced: none  • Plan for ADR Management: N/A     Hospitalizations and Urgent Care Since Last Assessment  • Hospitalizations or Admissions: none  • ED Visits: none  • Urgent Office Visits: none     Relevant Laboratory Values  Lab Results   Component Value Date    HCVGENOTYPE 1a 04/27/2023    HEPATITISC [id number] 04/27/2023     Fibrosis Score   Date/Time Value Ref Range Status   04/27/2023 10:09 AM 0.41 (H) 0.00 - 0.21 Final     Fibrosis Scoring:   Date/Time Value Ref Range Status   04/27/2023 10:09 AM Comment  Final     Comment:          <=0.21 = Stage F0 - No fibrosis  0.21 - 0.27 = Stage F0 - F1  0.27 - 0.31 = Stage F1 - Portal fibrosis  0.31 - 0.48 = Stage F1 - F2  0.48 - 0.58 = Stage F2 - Bridging fibrosis with few septa  0.58 - 0.72 = Stage F3 - Bridging fibrosis with many septa  0.72 - 0.74 = Stage F3 - F4        >0.74 = Stage F4 - Cirrhosis     Lab Results   Component Value Date    HAV Positive (A) 04/27/2023    HEPBSAB Non Reactive 04/27/2023    HEPBSAG Negative 04/27/2023    HEPCVIRUSABY >11.0 (H) 01/16/2023     Lab Results   Component Value Date    GLUCOSE 90 04/27/2023    BUN 12 04/27/2023    CREATININE 0.73 (L) 04/27/2023    BCR 16 04/27/2023     04/27/2023    K 4.6 04/27/2023     04/27/2023    CO2 23 04/27/2023    CALCIUM 9.3 04/27/2023    ALBUMIN 4.2 04/27/2023    ALT 33 04/27/2023    AST 32 04/27/2023    ALKPHOS 94 04/27/2023    BILITOT 0.3 04/27/2023    EGFRRESULT 106 04/27/2023     Lab Results   Component Value  Date    WBC 7.6 04/27/2023    HGB 15.7 04/27/2023    HCT 46.1 04/27/2023     04/27/2023    INR 1.0 04/27/2023       Adherence and Self-Administration  • Approximate Number of Doses Missed Since Last Assessment: none  • Ongoing or New Barriers to Patient Adherence and/or Self-Administration: none   • Methods for Supporting Patient Adherence and/or Self-Administration: N/A      Medication Therapy Recommendations  No medication therapy recommendations to display    Goals of Therapy  Goals related to the patient's specialty therapy were discussed with the patient. The Patient Goals segment of this outreach has been reviewed and updated.   Goals     • Specialty Pharmacy General Goal      Do not miss any doses  Sustained virologic response 12 weeks post-treatment (SVR12)             Quality of Life Assessment   Quality of Life related to the patient's specialty therapy was discussed with the patient. The QOL segment of this outreach has been reviewed and updated.   Quality of Life Assessment  Quality of Life Improvement Scale: No change  Comments on Quality of Life: tolerating well    Reassessment Plan & Follow-Up  1. Hepatitis C Therapy Plan: completed first month of treatment with no missed doses reported.  2. Other Medication Therapy Changes: none  3. Additional Plans, Therapy Recommendations, or Therapy Problems to Be Addressed: none   4. Pharmacist to perform regular reassessments approximately every (4) weeks from the previous assessment.  5. Specialty Pharmacy team to set up future refill outreaches and coordinate prescription delivery.     Attestation  I attest that the specialty medication(s) addressed above are appropriate for the patient based on my reassessment. If the prescribed therapy is at any point deemed not appropriate based on the current or future assessments, a consultation will be initiated with the patient's specialty care provider to determine the best course of action. The revised plan of  therapy will be documented along with any additional patient education provided.     Jamal Enriquez, PharmD  Clinical Specialty Pharmacist, Gastroenterology  05/30/23 09:10 EDT

## 2023-07-24 ENCOUNTER — OFFICE VISIT (OUTPATIENT)
Dept: FAMILY MEDICINE CLINIC | Facility: CLINIC | Age: 58
End: 2023-07-24
Payer: COMMERCIAL

## 2023-07-24 VITALS
HEIGHT: 72 IN | RESPIRATION RATE: 18 BRPM | OXYGEN SATURATION: 95 % | SYSTOLIC BLOOD PRESSURE: 134 MMHG | BODY MASS INDEX: 29.12 KG/M2 | WEIGHT: 215 LBS | HEART RATE: 79 BPM | DIASTOLIC BLOOD PRESSURE: 68 MMHG

## 2023-07-24 DIAGNOSIS — F43.21 GRIEF REACTION: ICD-10-CM

## 2023-07-24 DIAGNOSIS — B18.2 CHRONIC HEPATITIS C WITHOUT HEPATIC COMA: ICD-10-CM

## 2023-07-24 DIAGNOSIS — R73.9 HYPERGLYCEMIA: ICD-10-CM

## 2023-07-24 DIAGNOSIS — J30.9 ALLERGIC RHINITIS, UNSPECIFIED SEASONALITY, UNSPECIFIED TRIGGER: Primary | ICD-10-CM

## 2023-07-24 DIAGNOSIS — E78.5 HYPERLIPIDEMIA, UNSPECIFIED HYPERLIPIDEMIA TYPE: ICD-10-CM

## 2023-07-24 DIAGNOSIS — Z79.899 HIGH RISK MEDICATION USE: ICD-10-CM

## 2023-07-24 PROBLEM — G25.81 RLS (RESTLESS LEGS SYNDROME): Status: ACTIVE | Noted: 2023-07-24

## 2023-07-24 PROCEDURE — 99214 OFFICE O/P EST MOD 30 MIN: CPT | Performed by: FAMILY MEDICINE

## 2023-07-24 NOTE — PROGRESS NOTES
Follow Up Office Visit      Patient Name: Nilay Olvera  : 1965   MRN: [id number]     Chief Complaint:    Chief Complaint   Patient presents with    Diabetes     F/U after labs        History of Present Illness: Nilay Olvera is a 58 y.o. male who is here today to   follow-up on his chronic medical problems.  And go over blood work.  He says he is now being treated for his hep C and says that medicine gives him a headache almost every day but is almost done with a 3-month treatment.  He is doing well otherwise he is stable on his current medicines.  Still on Zoloft help with grief after the loss of his wife.  He says he is gets a lot of exercise at work walks all the time.    Labs reviewed with him his A1c is holding steady.    Review of Systems   Constitutional: Negative for fatigue and fever.   Respiratory: Negative for cough and shortness of breath.    Cardiovascular: Negative for chest pain and palpitations.   Skin: Negative for rash or itching      Subjective      Review of Systems:   Review of Systems    Past Medical History:   Past Medical History:   Diagnosis Date    Depression        Past Surgical History:   Past Surgical History:   Procedure Laterality Date    LEG SURGERY Left     Left lower leg repair/pins placed    WRIST FRACTURE SURGERY Right     plastic plate replaced broken bone       Family History:   Family History   Problem Relation Age of Onset    No Known Problems Mother     No Known Problems Father     No Known Problems Sister     No Known Problems Brother     No Known Problems Maternal Grandmother     No Known Problems Maternal Grandfather     No Known Problems Paternal Grandmother     No Known Problems Paternal Grandfather        Social History:   Social History     Socioeconomic History    Marital status:    Tobacco Use    Smoking status: Former     Packs/day: 1.00     Years: 44.00     Pack years: 44.00     Types: Cigarettes     Start date: 1977      "Quit date:      Years since quittin.5     Passive exposure: Past    Smokeless tobacco: Never   Vaping Use    Vaping Use: Never used   Substance and Sexual Activity    Alcohol use: Never    Drug use: Yes     Types: Marijuana    Sexual activity: Defer       Medications:     Current Outpatient Medications:     cyclobenzaprine (FLEXERIL) 5 MG tablet, Take 1 tablet by mouth 3 (Three) Times a Day As Needed for Muscle Spasms. Dosage unknown, Disp: , Rfl:     fexofenadine (Allegra Allergy) 180 MG tablet, Take 1 tablet by mouth Daily. As needed for allergy symptoms, Disp: 90 tablet, Rfl: 1    methocarbamol (ROBAXIN) 500 MG tablet, Take 1 tablet by mouth 4 (Four) Times a Day. Dosage unknown, Disp: , Rfl:     sertraline (ZOLOFT) 100 MG tablet, Take 1 tablet by mouth Daily., Disp: 90 tablet, Rfl: 3    Sofosbuvir-Velpatasvir (Epclusa) 400-100 MG tablet, Take 1 tablet by mouth Daily., Disp: 28 tablet, Rfl: 2    Allergies:   No Known Allergies    Objective     Physical Exam:  Vital Signs:   Vitals:    23 1129   BP: 134/68   BP Location: Left arm   Patient Position: Sitting   Cuff Size: Adult   Pulse: 79   Resp: 18   SpO2: 95%   Weight: 97.5 kg (215 lb)   Height: 181.6 cm (71.5\")     Body mass index is 29.57 kg/m².     Physical Exam  Vitals and nursing note reviewed.   Constitutional:       Appearance: Normal appearance.   HENT:      Head: Normocephalic and atraumatic.   Cardiovascular:      Rate and Rhythm: Normal rate and regular rhythm.   Pulmonary:      Effort: Pulmonary effort is normal.      Breath sounds: Normal breath sounds.   Musculoskeletal:         General: Normal range of motion.      Cervical back: Normal range of motion and neck supple.      Right lower leg: No edema.      Left lower leg: No edema.   Skin:     General: Skin is warm and dry.   Neurological:      General: No focal deficit present.      Mental Status: He is alert.       Procedures    PHQ-9 Total Score:       Assessment / Plan  "     Assessment/Plan:   Diagnoses and all orders for this visit:    1. Allergic rhinitis, unspecified seasonality, unspecified trigger (Primary)    2. Chronic hepatitis C without hepatic coma    3. High risk medication use  -     CBC Auto Differential; Future  -     Comprehensive Metabolic Panel; Future    4. Hyperglycemia  -     Hemoglobin A1c; Future    5. Hyperlipidemia, unspecified hyperlipidemia type  -     Lipid Panel; Future    6. Grief reaction         Continue with current meds he stable on Zoloft and Allegra for allergies and continue follow-up with gastroenterology regarding his hepatitis C.    He will discuss with them whether he needs to get hep B vaccination later he thinks they may do it after his treatments are completed    Follow-up in 6 months for routine blood work and follow-up continue to work diet exercise avoid concentrated sweets cut back on carbs and walk 30-60 minutes/day.    He states he uses cyclobenzaprine at night occasionally for his back when that flares up and other muscle relaxers as he has had some restless leg syndrome diagnosed in the past.         Follow Up:   Return in about 6 months (around 1/24/2024) for Annual physical, Labs prior next visit.        Levi Mcrae MD  [facility]   Portions of note created with Dragon voice recognition technology

## 2023-08-14 DIAGNOSIS — B18.2 CHRONIC HEPATITIS C WITHOUT HEPATIC COMA: Primary | ICD-10-CM

## 2023-08-17 ENCOUNTER — LAB (OUTPATIENT)
Dept: FAMILY MEDICINE CLINIC | Facility: CLINIC | Age: 58
End: 2023-08-17
Payer: COMMERCIAL

## 2023-08-17 DIAGNOSIS — R73.9 HYPERGLYCEMIA: ICD-10-CM

## 2023-08-17 DIAGNOSIS — Z79.899 HIGH RISK MEDICATION USE: ICD-10-CM

## 2023-08-17 DIAGNOSIS — E78.5 HYPERLIPIDEMIA, UNSPECIFIED HYPERLIPIDEMIA TYPE: ICD-10-CM

## 2023-08-17 PROCEDURE — 36415 COLL VENOUS BLD VENIPUNCTURE: CPT | Performed by: FAMILY MEDICINE

## 2023-08-18 LAB
ALBUMIN SERPL-MCNC: 4.5 G/DL (ref 3.8–4.9)
ALBUMIN/GLOB SERPL: 1.9 {RATIO} (ref 1.2–2.2)
ALP SERPL-CCNC: 97 IU/L (ref 44–121)
ALT SERPL-CCNC: 19 IU/L (ref 0–44)
AST SERPL-CCNC: 27 IU/L (ref 0–40)
BASOPHILS # BLD AUTO: 0.1 X10E3/UL (ref 0–0.2)
BASOPHILS NFR BLD AUTO: 1 %
BILIRUB SERPL-MCNC: 0.4 MG/DL (ref 0–1.2)
BUN SERPL-MCNC: 13 MG/DL (ref 6–24)
BUN/CREAT SERPL: 19 (ref 9–20)
CALCIUM SERPL-MCNC: 9.6 MG/DL (ref 8.7–10.2)
CHLORIDE SERPL-SCNC: 104 MMOL/L (ref 96–106)
CHOLEST SERPL-MCNC: 195 MG/DL (ref 100–199)
CO2 SERPL-SCNC: 21 MMOL/L (ref 20–29)
CREAT SERPL-MCNC: 0.7 MG/DL (ref 0.76–1.27)
EGFRCR SERPLBLD CKD-EPI 2021: 107 ML/MIN/1.73
EOSINOPHIL # BLD AUTO: 0.3 X10E3/UL (ref 0–0.4)
EOSINOPHIL NFR BLD AUTO: 3 %
ERYTHROCYTE [DISTWIDTH] IN BLOOD BY AUTOMATED COUNT: 13.1 % (ref 11.6–15.4)
GLOBULIN SER CALC-MCNC: 2.4 G/DL (ref 1.5–4.5)
GLUCOSE SERPL-MCNC: 90 MG/DL (ref 70–99)
HBA1C MFR BLD: 5.7 % (ref 4.8–5.6)
HCT VFR BLD AUTO: 47.5 % (ref 37.5–51)
HDLC SERPL-MCNC: 40 MG/DL
HGB BLD-MCNC: 15.4 G/DL (ref 13–17.7)
IMM GRANULOCYTES # BLD AUTO: 0 X10E3/UL (ref 0–0.1)
IMM GRANULOCYTES NFR BLD AUTO: 0 %
LDLC SERPL CALC-MCNC: 131 MG/DL (ref 0–99)
LYMPHOCYTES # BLD AUTO: 3.1 X10E3/UL (ref 0.7–3.1)
LYMPHOCYTES NFR BLD AUTO: 32 %
MCH RBC QN AUTO: 30.4 PG (ref 26.6–33)
MCHC RBC AUTO-ENTMCNC: 32.4 G/DL (ref 31.5–35.7)
MCV RBC AUTO: 94 FL (ref 79–97)
MONOCYTES # BLD AUTO: 0.9 X10E3/UL (ref 0.1–0.9)
MONOCYTES NFR BLD AUTO: 9 %
NEUTROPHILS # BLD AUTO: 5.3 X10E3/UL (ref 1.4–7)
NEUTROPHILS NFR BLD AUTO: 55 %
PLATELET # BLD AUTO: 218 X10E3/UL (ref 150–450)
POTASSIUM SERPL-SCNC: 4.4 MMOL/L (ref 3.5–5.2)
PROT SERPL-MCNC: 6.9 G/DL (ref 6–8.5)
RBC # BLD AUTO: 5.06 X10E6/UL (ref 4.14–5.8)
SODIUM SERPL-SCNC: 140 MMOL/L (ref 134–144)
TRIGL SERPL-MCNC: 134 MG/DL (ref 0–149)
VLDLC SERPL CALC-MCNC: 24 MG/DL (ref 5–40)
WBC # BLD AUTO: 9.7 X10E3/UL (ref 3.4–10.8)

## 2023-08-25 ENCOUNTER — LAB (OUTPATIENT)
Dept: FAMILY MEDICINE CLINIC | Facility: CLINIC | Age: 58
End: 2023-08-25
Payer: COMMERCIAL

## 2023-08-26 LAB
ALBUMIN SERPL-MCNC: 4.4 G/DL (ref 3.8–4.9)
ALBUMIN/GLOB SERPL: 1.8 {RATIO} (ref 1.2–2.2)
ALP SERPL-CCNC: 101 IU/L (ref 44–121)
ALT SERPL-CCNC: 18 IU/L (ref 0–44)
AST SERPL-CCNC: 26 IU/L (ref 0–40)
BILIRUB SERPL-MCNC: 0.2 MG/DL (ref 0–1.2)
BUN SERPL-MCNC: 13 MG/DL (ref 6–24)
BUN/CREAT SERPL: 17 (ref 9–20)
CALCIUM SERPL-MCNC: 9.4 MG/DL (ref 8.7–10.2)
CHLORIDE SERPL-SCNC: 105 MMOL/L (ref 96–106)
CO2 SERPL-SCNC: 24 MMOL/L (ref 20–29)
CREAT SERPL-MCNC: 0.77 MG/DL (ref 0.76–1.27)
EGFRCR SERPLBLD CKD-EPI 2021: 104 ML/MIN/1.73
GLOBULIN SER CALC-MCNC: 2.4 G/DL (ref 1.5–4.5)
GLUCOSE SERPL-MCNC: 114 MG/DL (ref 70–99)
POTASSIUM SERPL-SCNC: 4.1 MMOL/L (ref 3.5–5.2)
PROT SERPL-MCNC: 6.8 G/DL (ref 6–8.5)
SODIUM SERPL-SCNC: 143 MMOL/L (ref 134–144)

## 2023-08-28 LAB
HCV RNA SERPL NAA+PROBE-ACNC: NORMAL IU/ML
TEST INFORMATION: NORMAL

## 2023-08-28 NOTE — PROGRESS NOTES
LVM - SENT RESULT TO [facility].
Please let CW know that his Hep C quant is not detectable. We will check again in 6 months to assess sustained viral response. 
[Negative] : Heme/Lymph

## 2023-09-01 ENCOUNTER — SPECIALTY PHARMACY (OUTPATIENT)
Dept: PHARMACY | Facility: TELEHEALTH | Age: 58
End: 2023-09-01
Payer: COMMERCIAL

## 2024-01-11 ENCOUNTER — LAB (OUTPATIENT)
Dept: FAMILY MEDICINE CLINIC | Facility: CLINIC | Age: 59
End: 2024-01-11
Payer: COMMERCIAL

## 2024-01-11 DIAGNOSIS — Z79.899 ENCOUNTER FOR LONG-TERM (CURRENT) USE OF OTHER MEDICATIONS: Primary | ICD-10-CM

## 2024-01-11 PROCEDURE — 36415 COLL VENOUS BLD VENIPUNCTURE: CPT | Performed by: FAMILY MEDICINE

## 2024-01-12 LAB
ALBUMIN SERPL-MCNC: 4.5 G/DL (ref 3.8–4.9)
ALBUMIN/GLOB SERPL: 2 {RATIO} (ref 1.2–2.2)
ALP SERPL-CCNC: 114 IU/L (ref 44–121)
ALT SERPL-CCNC: 19 IU/L (ref 0–44)
AST SERPL-CCNC: 24 IU/L (ref 0–40)
BASOPHILS # BLD AUTO: 0.1 X10E3/UL (ref 0–0.2)
BASOPHILS NFR BLD AUTO: 1 %
BILIRUB SERPL-MCNC: 0.3 MG/DL (ref 0–1.2)
BUN SERPL-MCNC: 14 MG/DL (ref 6–24)
BUN/CREAT SERPL: 19 (ref 9–20)
CALCIUM SERPL-MCNC: 9.7 MG/DL (ref 8.7–10.2)
CHLORIDE SERPL-SCNC: 106 MMOL/L (ref 96–106)
CHOLEST SERPL-MCNC: 206 MG/DL (ref 100–199)
CK SERPL-CCNC: 217 U/L (ref 41–331)
CO2 SERPL-SCNC: 23 MMOL/L (ref 20–29)
CREAT SERPL-MCNC: 0.72 MG/DL (ref 0.76–1.27)
EGFRCR SERPLBLD CKD-EPI 2021: 106 ML/MIN/1.73
EOSINOPHIL # BLD AUTO: 0.3 X10E3/UL (ref 0–0.4)
EOSINOPHIL NFR BLD AUTO: 4 %
ERYTHROCYTE [DISTWIDTH] IN BLOOD BY AUTOMATED COUNT: 13.1 % (ref 11.6–15.4)
GLOBULIN SER CALC-MCNC: 2.3 G/DL (ref 1.5–4.5)
GLUCOSE SERPL-MCNC: 97 MG/DL (ref 70–99)
HBA1C MFR BLD: 5.9 % (ref 4.8–5.6)
HCT VFR BLD AUTO: 46.5 % (ref 37.5–51)
HDLC SERPL-MCNC: 34 MG/DL
HGB BLD-MCNC: 15.6 G/DL (ref 13–17.7)
IMM GRANULOCYTES # BLD AUTO: 0 X10E3/UL (ref 0–0.1)
IMM GRANULOCYTES NFR BLD AUTO: 0 %
LDLC SERPL CALC-MCNC: 137 MG/DL (ref 0–99)
LYMPHOCYTES # BLD AUTO: 2.3 X10E3/UL (ref 0.7–3.1)
LYMPHOCYTES NFR BLD AUTO: 29 %
MCH RBC QN AUTO: 30.4 PG (ref 26.6–33)
MCHC RBC AUTO-ENTMCNC: 33.5 G/DL (ref 31.5–35.7)
MCV RBC AUTO: 91 FL (ref 79–97)
MONOCYTES # BLD AUTO: 0.8 X10E3/UL (ref 0.1–0.9)
MONOCYTES NFR BLD AUTO: 11 %
NEUTROPHILS # BLD AUTO: 4.3 X10E3/UL (ref 1.4–7)
NEUTROPHILS NFR BLD AUTO: 55 %
PLATELET # BLD AUTO: 197 X10E3/UL (ref 150–450)
POTASSIUM SERPL-SCNC: 4.3 MMOL/L (ref 3.5–5.2)
PROT SERPL-MCNC: 6.8 G/DL (ref 6–8.5)
RBC # BLD AUTO: 5.14 X10E6/UL (ref 4.14–5.8)
SODIUM SERPL-SCNC: 145 MMOL/L (ref 134–144)
TRIGL SERPL-MCNC: 191 MG/DL (ref 0–149)
TSH SERPL DL<=0.005 MIU/L-ACNC: 0.86 UIU/ML (ref 0.45–4.5)
VLDLC SERPL CALC-MCNC: 35 MG/DL (ref 5–40)
WBC # BLD AUTO: 7.9 X10E3/UL (ref 3.4–10.8)

## 2024-01-18 ENCOUNTER — OFFICE VISIT (OUTPATIENT)
Dept: FAMILY MEDICINE CLINIC | Facility: CLINIC | Age: 59
End: 2024-01-18
Payer: COMMERCIAL

## 2024-01-18 VITALS
SYSTOLIC BLOOD PRESSURE: 106 MMHG | HEIGHT: 72 IN | WEIGHT: 233 LBS | BODY MASS INDEX: 31.56 KG/M2 | HEART RATE: 82 BPM | DIASTOLIC BLOOD PRESSURE: 68 MMHG | OXYGEN SATURATION: 96 %

## 2024-01-18 DIAGNOSIS — J30.9 ALLERGIC RHINITIS, UNSPECIFIED SEASONALITY, UNSPECIFIED TRIGGER: ICD-10-CM

## 2024-01-18 DIAGNOSIS — F43.21 GRIEF REACTION: ICD-10-CM

## 2024-01-18 DIAGNOSIS — R73.9 HYPERGLYCEMIA: ICD-10-CM

## 2024-01-18 DIAGNOSIS — Z00.00 ANNUAL PHYSICAL EXAM: Primary | ICD-10-CM

## 2024-01-18 DIAGNOSIS — E78.5 HYPERLIPIDEMIA, UNSPECIFIED HYPERLIPIDEMIA TYPE: ICD-10-CM

## 2024-01-18 DIAGNOSIS — Z23 IMMUNIZATION DUE: ICD-10-CM

## 2024-01-18 RX ORDER — FEXOFENADINE HCL 180 MG/1
180 TABLET ORAL DAILY
Qty: 90 TABLET | Refills: 1 | Status: SHIPPED | OUTPATIENT
Start: 2024-01-18

## 2024-01-18 RX ORDER — SERTRALINE HYDROCHLORIDE 100 MG/1
TABLET, FILM COATED ORAL
Qty: 135 TABLET | Refills: 3 | Status: SHIPPED | OUTPATIENT
Start: 2024-01-18

## 2024-01-18 NOTE — PATIENT INSTRUCTIONS

## 2024-01-18 NOTE — PROGRESS NOTES
Male Physical Note      Patient Name: Nilay Olvera  : 1965   MRN: [id number]     Chief Complaint:    Chief Complaint   Patient presents with    Follow-up       History of Present Illness: Nilay Olvera is a 58 y.o. male who is here today for their annual health maintenance and physical.  He is also here to go over blood work and says he has been a little depressed that he actually was eating a lot of ice cream and foods that he knows he should not when he got a little blue but is doing better now but thinks he could maybe stand to have his dose increased a little bit.  He is still working as a  gets a lot of exercise there.    He finished his hepatitis C treatments and now wants hepatitis B immunization as well as his tetanus shot updated    Labs reviewed with him    Review of Systems   Constitutional: Negative for fatigue and fever.   HENT: Negative for ear pain and sore throat.    Eyes: Negative for visual disturbance.   Respiratory: Negative for cough, chest tightness and shortness of breath.    Cardiovascular: Negative for chest pain and palpitations.   Gastrointestinal: Negative for abdominal pain, blood in stool, melena, constipation, diarrhea, nausea and vomiting.   Endocrine: Negative for cold intolerance and heat intolerance.   Genitourinary: Negative for dysuria and hematuria.   Musculoskeletal: Negative for back pain and joint swelling.   Skin: Negative for rash and wound.   Allergic/Immunologic: Negative for environmental allergies and food allergies.         Subjective      Review of Systems:   Review of Systems    Past Medical History, Social History, Family History and Care Team were all reviewed with patient and updated as appropriate.     Medications:     Current Outpatient Medications:     cyclobenzaprine (FLEXERIL) 5 MG tablet, Take 1 tablet by mouth 3 (Three) Times a Day As Needed for Muscle Spasms. Dosage unknown, Disp: , Rfl:     fexofenadine (Allegra  "Allergy) 180 MG tablet, Take 1 tablet by mouth Daily. As needed for allergy symptoms, Disp: 90 tablet, Rfl: 1    methocarbamol (ROBAXIN) 500 MG tablet, Take 1 tablet by mouth 4 (Four) Times a Day. Dosage unknown, Disp: , Rfl:     sertraline (ZOLOFT) 100 MG tablet, 1  and  half  tabs po  qd, Disp: 135 tablet, Rfl: 3    Allergies:   No Known Allergies    I  CT for Smoker (Age 55-75, 30pk yr): N/A      Depression: PHQ-2 Depression Screening  PHQ-9 Total Score: 6       Intimate partner violence: (Screen on initial visit, older adult with injury or evidence of neglect):  Violence can be a problem in many people's lives, so I now ask every patient about trauma or abuse they may have experienced in a relationship.  Stress/Safety - Do you feel safe in your relationship?  Afraid/Abused - Have you ever been in a relationship where you were threatened, hurt, or afraid?  Friend/Family - Are your friends aware you have been hurt?  Emergency Plan - Do you have a safe place to go and the resources you need in an emergency?    Osteoporosis:   Men: history of low trauma fracture, androgen deprivation therapy for prostate cancer, hypogonadism, primary hyperparathyroidism, intestinal disorders.     Objective     Physical Exam:  Vital Signs:   Vitals:    01/18/24 0834   BP: 106/68   BP Location: Right arm   Patient Position: Sitting   Cuff Size: Large Adult   Pulse: 82   SpO2: 96%   Weight: 106 kg (233 lb)   Height: 181.6 cm (71.5\")     Body mass index is 32.04 kg/m².        Physical Exam  Vitals and nursing note reviewed.   Constitutional:       General: He is not in acute distress.     Appearance: Normal appearance.   HENT:      Head: Normocephalic and atraumatic.      Right Ear: Tympanic membrane, ear canal and external ear normal.      Left Ear: Tympanic membrane, ear canal and external ear normal.      Nose: Nose normal.      Mouth/Throat:      Mouth: Mucous membranes are moist.      Pharynx: Oropharynx is clear.   Eyes:      " Extraocular Movements: Extraocular movements intact.      Conjunctiva/sclera: Conjunctivae normal.      Pupils: Pupils are equal, round, and reactive to light.   Neck:      Thyroid: No thyroid mass or thyroid tenderness.   Cardiovascular:      Rate and Rhythm: Normal rate and regular rhythm.      Heart sounds: Normal heart sounds.      Comments: RADIAL PULSES NML  Pulmonary:      Effort: Pulmonary effort is normal.      Breath sounds: Normal breath sounds.   Abdominal:      General: Abdomen is flat. Bowel sounds are normal.      Palpations: Abdomen is soft. There is no mass.      Tenderness: There is no abdominal tenderness. There is no guarding or rebound.   Musculoskeletal:      Cervical back: Normal range of motion and neck supple.      Right lower leg: No edema.      Left lower leg: No edema.   Lymphadenopathy:      Cervical: No cervical adenopathy.   Skin:     General: Skin is warm and dry.      Findings: No rash.   Neurological:      General: No focal deficit present.      Mental Status: He is alert and oriented to person, place, and time.      Sensory: Sensation is intact.      Motor: Motor function is intact.      Deep Tendon Reflexes: Reflexes normal.      Comments: SYMMETRIC PATELLAR REFLEXES  Cranial nerves 2 through 12 intact   Psychiatric:         Attention and Perception: Attention normal.         Mood and Affect: Mood normal.         Behavior: Behavior normal.         Thought Content: Thought content normal.         Judgment: Judgment normal.         Procedures    Assessment / Plan      Assessment/Plan:   Diagnoses and all orders for this visit:    1. Annual physical exam (Primary)    2. Immunization due  -     Hepatitis B Vaccine Adult IM (ENERGIX/RECOMBIVAX)  -     Tdap Vaccine => 8yo IM (BOOSTRIX)    3. Grief reaction  -     sertraline (ZOLOFT) 100 MG tablet; 1  and  half  tabs po  qd  Dispense: 135 tablet; Refill: 3    4. Allergic rhinitis, unspecified seasonality, unspecified trigger  -      fexofenadine (Allegra Allergy) 180 MG tablet; Take 1 tablet by mouth Daily. As needed for allergy symptoms  Dispense: 90 tablet; Refill: 1    5. Hyperglycemia  -     Comprehensive Metabolic Panel; Future  -     Hemoglobin A1c; Future    6. Hyperlipidemia, unspecified hyperlipidemia type  -     Lipid Panel; Future       Hepatitis B #1 and Tdap given today injection site soreness redness being the most common side effects he agrees to proceed he knows to follow-up in 2 months for hepatitis B #2 and then 6 months for his third shot.    For his mild depression/grief reaction will increase Zoloft 250 mg/day if not better in a month he will let me know and he certainly knows we can increase the dose or add in another medication etc.    Continue Allegra for allergies    Work on diet exercise he is going to cut back on his ice cream certainly and knows to walk 30 minutes 5 days a week and eat healthier foods like fruits and vegetables and lean meats grilled or baked not deep-fried etc.    He had colonoscopy in September 2022 with 1 polyp     He declined PSA screening.    Follow Up:   Return in about 6 months (around 7/18/2024) for Recheck, Labs prior next visit.    Healthcare Maintenance:   Nilay Esquivel [facility] voices understanding and acceptance of this advice and will call back with any further questions or concerns. AVS with preventive healthcare tips printed for patient.         Levi Mcrae MD  [facility]  Portions of note created with Dragon voice recognition technology

## 2024-01-18 NOTE — LETTER
[facility]  Vaccine Consent Form    Patient Name:  Nilay Esquivel [facility]  Patient :  1965     Vaccine(s) Ordered    Hepatitis B Vaccine Adult IM (ENERGIX/RECOMBIVAX)  Tdap Vaccine => 6yo IM (BOOSTRIX)        Screening Checklist  The following questions should be completed prior to vaccination. If you answer “yes” to any question, it does not necessarily mean you should not be vaccinated. It just means we may need to clarify or ask more questions. If a question is unclear, please ask your healthcare provider to explain it.    Yes No   Any fever or moderate to severe illness today (mild illness and/or antibiotic treatment are not contraindications)?     Do you have a history of a serious reaction to any previous vaccinations, such as anaphylaxis, encephalopathy within 7 days, Guillain-[place] syndrome within 6 weeks, seizure?     Have you received any live vaccine(s) (e.g MMR, GAUTAM) or any other vaccines in the last month (to ensure duplicate doses aren't given)?     Do you have an anaphylactic allergy to latex (DTaP, DTaP-IPV, Hep A, Hep B, MenB, RV, Td, Tdap), baker’s yeast (Hep B, HPV), polysorbates (RSV, nirsevimab, PCV 20, Rotavirrus, Tdap, Shingrix), or gelatin (GAUTAM, MMR)?     Do you have an anaphylactic allergy to neomycin (Rabies, GAUTAM, MMR, IPV, Hep A), polymyxin B (IPV), or streptomycin (IPV)?      Any cancer, leukemia, AIDS, or other immune system disorder? (GAUTAM, MMR, RV)     Do you have a parent, brother, or sister with an immune system problem (if immune competence of vaccine recipient clinically verified, can proceed)? (MMR, GAUTAM)     Any recent steroid treatments for >2 weeks, chemotherapy, or radiation treatment? (GAUTAM, MMR)     Have you received antibody-containing blood transfusions or IVIG in the past 11 months (recommended interval is dependent on product)? (MMR, GAUTAM)     Have you taken antiviral drugs (acyclovir, famciclovir, valacyclovir for GAUTAM) in the last 24 or 48 hours, respectively?     "  Are you pregnant or planning to become pregnant within 1 month? (GAUTAM, MMR, HPV, IPV, MenB, Abrexvy; For Hep B- refer to Engerix-B; For RSV - Abrysvo is indicated for 32-36 weeks of pregnancy from September to January)     For infants, have you ever been told your child has had intussusception or a medical emergency involving obstruction of the intestine (Rotavirus)? If not for an infant, can skip this question.         *Ordering Physicians/APC should be consulted if \"yes\" is checked by the patient or guardian above.  I have received, read, and understand the Vaccine Information Statement (VIS) for each vaccine ordered.  I have considered my or my child's health status as well as the health status of my close contacts.  I have taken the opportunity to discuss my vaccine questions with my or my child's health care provider.   I have requested that the ordered vaccine(s) be given to me or my child.  I understand the benefits and risks of the vaccines.  I understand that I should remain in the clinic for 15 minutes after receiving the vaccine(s).  _________________________________________________________  Signature of Patient or Parent/Legal Guardian ____________________  Date     "

## 2024-02-19 ENCOUNTER — TELEPHONE (OUTPATIENT)
Dept: GASTROENTEROLOGY | Facility: CLINIC | Age: 59
End: 2024-02-19
Payer: COMMERCIAL

## 2024-02-19 DIAGNOSIS — B18.2 CHRONIC HEPATITIS C WITHOUT HEPATIC COMA: Primary | ICD-10-CM

## 2024-02-19 NOTE — TELEPHONE ENCOUNTER
----- Message from Emilie Sawyer PA-C sent at 2/19/2024  8:44 AM EST -----  Can you please let CW know that I have ordered repeat labs to assess response to Epclusa 6mo post completion of treatment? Thanks!    ----- Message -----  From: Emilie Sawyer PA-C  Sent: 2/19/2024  12:00 AM EST  To: Emilie Sawyer PA-C    CMP, HCV quant

## 2024-02-22 ENCOUNTER — LAB (OUTPATIENT)
Dept: FAMILY MEDICINE CLINIC | Facility: CLINIC | Age: 59
End: 2024-02-22
Payer: COMMERCIAL

## 2024-02-22 DIAGNOSIS — E78.5 HYPERLIPIDEMIA, UNSPECIFIED HYPERLIPIDEMIA TYPE: ICD-10-CM

## 2024-02-22 DIAGNOSIS — R73.9 HYPERGLYCEMIA: ICD-10-CM

## 2024-02-22 PROCEDURE — 36415 COLL VENOUS BLD VENIPUNCTURE: CPT | Performed by: FAMILY MEDICINE

## 2024-02-23 LAB
ALBUMIN SERPL-MCNC: 4.5 G/DL (ref 3.8–4.9)
ALBUMIN/GLOB SERPL: 1.8 {RATIO} (ref 1.2–2.2)
ALP SERPL-CCNC: 101 IU/L (ref 44–121)
ALT SERPL-CCNC: 18 IU/L (ref 0–44)
AST SERPL-CCNC: 27 IU/L (ref 0–40)
BILIRUB SERPL-MCNC: 0.4 MG/DL (ref 0–1.2)
BUN SERPL-MCNC: 13 MG/DL (ref 6–24)
BUN/CREAT SERPL: 19 (ref 9–20)
CALCIUM SERPL-MCNC: 9.4 MG/DL (ref 8.7–10.2)
CHLORIDE SERPL-SCNC: 104 MMOL/L (ref 96–106)
CHOLEST SERPL-MCNC: 214 MG/DL (ref 100–199)
CO2 SERPL-SCNC: 23 MMOL/L (ref 20–29)
CREAT SERPL-MCNC: 0.67 MG/DL (ref 0.76–1.27)
EGFRCR SERPLBLD CKD-EPI 2021: 108 ML/MIN/1.73
GLOBULIN SER CALC-MCNC: 2.5 G/DL (ref 1.5–4.5)
GLUCOSE SERPL-MCNC: 99 MG/DL (ref 70–99)
HBA1C MFR BLD: 5.6 % (ref 4.8–5.6)
HDLC SERPL-MCNC: 39 MG/DL
LDLC SERPL CALC-MCNC: 152 MG/DL (ref 0–99)
POTASSIUM SERPL-SCNC: 4.8 MMOL/L (ref 3.5–5.2)
PROT SERPL-MCNC: 7 G/DL (ref 6–8.5)
SODIUM SERPL-SCNC: 141 MMOL/L (ref 134–144)
TRIGL SERPL-MCNC: 124 MG/DL (ref 0–149)
VLDLC SERPL CALC-MCNC: 23 MG/DL (ref 5–40)

## 2024-02-28 ENCOUNTER — TELEPHONE (OUTPATIENT)
Dept: GASTROENTEROLOGY | Facility: CLINIC | Age: 59
End: 2024-02-28
Payer: COMMERCIAL

## 2024-02-28 NOTE — TELEPHONE ENCOUNTER
I spoke with CW. Lab didn't draw HEP viral load. Patient agree to go get lab drawn this afternoon or tomorrow.

## 2024-02-29 LAB
ALBUMIN SERPL-MCNC: 4.6 G/DL (ref 3.8–4.9)
ALBUMIN/GLOB SERPL: 1.7 {RATIO} (ref 1.2–2.2)
ALP SERPL-CCNC: 111 IU/L (ref 44–121)
ALT SERPL-CCNC: 17 IU/L (ref 0–44)
AST SERPL-CCNC: 24 IU/L (ref 0–40)
BILIRUB SERPL-MCNC: 0.3 MG/DL (ref 0–1.2)
BUN SERPL-MCNC: 13 MG/DL (ref 6–24)
BUN/CREAT SERPL: 17 (ref 9–20)
CALCIUM SERPL-MCNC: 9.8 MG/DL (ref 8.7–10.2)
CHLORIDE SERPL-SCNC: 103 MMOL/L (ref 96–106)
CO2 SERPL-SCNC: 24 MMOL/L (ref 20–29)
CREAT SERPL-MCNC: 0.76 MG/DL (ref 0.76–1.27)
EGFRCR SERPLBLD CKD-EPI 2021: 104 ML/MIN/1.73
GLOBULIN SER CALC-MCNC: 2.7 G/DL (ref 1.5–4.5)
GLUCOSE SERPL-MCNC: 96 MG/DL (ref 70–99)
POTASSIUM SERPL-SCNC: 5.3 MMOL/L (ref 3.5–5.2)
PROT SERPL-MCNC: 7.3 G/DL (ref 6–8.5)
SODIUM SERPL-SCNC: 142 MMOL/L (ref 134–144)

## 2024-03-01 LAB
HCV RNA SERPL NAA+PROBE-ACNC: NORMAL IU/ML
TEST INFORMATION: NORMAL

## 2024-03-18 ENCOUNTER — CLINICAL SUPPORT (OUTPATIENT)
Dept: FAMILY MEDICINE CLINIC | Facility: CLINIC | Age: 59
End: 2024-03-18
Payer: COMMERCIAL

## 2024-03-18 DIAGNOSIS — Z23 IMMUNIZATION DUE: Primary | ICD-10-CM

## 2024-03-28 ENCOUNTER — PATIENT MESSAGE (OUTPATIENT)
Dept: GASTROENTEROLOGY | Facility: CLINIC | Age: 59
End: 2024-03-28
Payer: COMMERCIAL

## 2024-03-29 NOTE — TELEPHONE ENCOUNTER
From: Alli HERBERT  To: Nilay Alvin Olvera  Sent: 3/28/2024 10:32 AM EDT  Subject: OVERDUE LAB ORDERS    Mr Olvera,    Please get overdue labs drawn(viral load, cmp) ordered by Emilie Sawyer PA-C on 7/10/2023. Thanks

## 2024-07-11 ENCOUNTER — LAB (OUTPATIENT)
Dept: FAMILY MEDICINE CLINIC | Facility: CLINIC | Age: 59
End: 2024-07-11
Payer: COMMERCIAL

## 2024-07-11 DIAGNOSIS — Z79.899 ENCOUNTER FOR LONG-TERM (CURRENT) USE OF OTHER MEDICATIONS: Primary | ICD-10-CM

## 2024-07-11 PROCEDURE — 36415 COLL VENOUS BLD VENIPUNCTURE: CPT | Performed by: FAMILY MEDICINE

## 2024-07-12 LAB
ALBUMIN SERPL-MCNC: 4.3 G/DL (ref 3.8–4.9)
ALP SERPL-CCNC: 134 IU/L (ref 44–121)
ALT SERPL-CCNC: 17 IU/L (ref 0–44)
AST SERPL-CCNC: 22 IU/L (ref 0–40)
BASOPHILS # BLD AUTO: 0.1 X10E3/UL (ref 0–0.2)
BASOPHILS NFR BLD AUTO: 1 %
BILIRUB SERPL-MCNC: 0.3 MG/DL (ref 0–1.2)
BUN SERPL-MCNC: 14 MG/DL (ref 6–24)
BUN/CREAT SERPL: 18 (ref 9–20)
CALCIUM SERPL-MCNC: 9.5 MG/DL (ref 8.7–10.2)
CHLORIDE SERPL-SCNC: 102 MMOL/L (ref 96–106)
CHOLEST SERPL-MCNC: 215 MG/DL (ref 100–199)
CK SERPL-CCNC: 185 U/L (ref 41–331)
CO2 SERPL-SCNC: 21 MMOL/L (ref 20–29)
CREAT SERPL-MCNC: 0.78 MG/DL (ref 0.76–1.27)
EGFRCR SERPLBLD CKD-EPI 2021: 103 ML/MIN/1.73
EOSINOPHIL # BLD AUTO: 0.4 X10E3/UL (ref 0–0.4)
EOSINOPHIL NFR BLD AUTO: 4 %
ERYTHROCYTE [DISTWIDTH] IN BLOOD BY AUTOMATED COUNT: 13.2 % (ref 11.6–15.4)
GLOBULIN SER CALC-MCNC: 2.6 G/DL (ref 1.5–4.5)
GLUCOSE SERPL-MCNC: 114 MG/DL (ref 70–99)
HBA1C MFR BLD: 5.8 % (ref 4.8–5.6)
HCT VFR BLD AUTO: 47.4 % (ref 37.5–51)
HDLC SERPL-MCNC: 30 MG/DL
HGB BLD-MCNC: 16.2 G/DL (ref 13–17.7)
IMM GRANULOCYTES # BLD AUTO: 0 X10E3/UL (ref 0–0.1)
IMM GRANULOCYTES NFR BLD AUTO: 0 %
LDLC SERPL CALC-MCNC: 117 MG/DL (ref 0–99)
LYMPHOCYTES # BLD AUTO: 3 X10E3/UL (ref 0.7–3.1)
LYMPHOCYTES NFR BLD AUTO: 28 %
MCH RBC QN AUTO: 31.8 PG (ref 26.6–33)
MCHC RBC AUTO-ENTMCNC: 34.2 G/DL (ref 31.5–35.7)
MCV RBC AUTO: 93 FL (ref 79–97)
MONOCYTES # BLD AUTO: 1 X10E3/UL (ref 0.1–0.9)
MONOCYTES NFR BLD AUTO: 9 %
NEUTROPHILS # BLD AUTO: 6.3 X10E3/UL (ref 1.4–7)
NEUTROPHILS NFR BLD AUTO: 58 %
PLATELET # BLD AUTO: 198 X10E3/UL (ref 150–450)
POTASSIUM SERPL-SCNC: 4.2 MMOL/L (ref 3.5–5.2)
PROT SERPL-MCNC: 6.9 G/DL (ref 6–8.5)
RBC # BLD AUTO: 5.1 X10E6/UL (ref 4.14–5.8)
SODIUM SERPL-SCNC: 141 MMOL/L (ref 134–144)
TRIGL SERPL-MCNC: 385 MG/DL (ref 0–149)
TSH SERPL DL<=0.005 MIU/L-ACNC: 2.35 UIU/ML (ref 0.45–4.5)
VLDLC SERPL CALC-MCNC: 68 MG/DL (ref 5–40)
WBC # BLD AUTO: 10.8 X10E3/UL (ref 3.4–10.8)

## 2024-07-18 ENCOUNTER — OFFICE VISIT (OUTPATIENT)
Dept: FAMILY MEDICINE CLINIC | Facility: CLINIC | Age: 59
End: 2024-07-18
Payer: COMMERCIAL

## 2024-07-18 VITALS
BODY MASS INDEX: 31.02 KG/M2 | OXYGEN SATURATION: 97 % | SYSTOLIC BLOOD PRESSURE: 110 MMHG | DIASTOLIC BLOOD PRESSURE: 70 MMHG | HEIGHT: 72 IN | WEIGHT: 229 LBS | HEART RATE: 74 BPM

## 2024-07-18 DIAGNOSIS — J30.9 ALLERGIC RHINITIS, UNSPECIFIED SEASONALITY, UNSPECIFIED TRIGGER: Primary | ICD-10-CM

## 2024-07-18 DIAGNOSIS — Z23 IMMUNIZATION DUE: ICD-10-CM

## 2024-07-18 DIAGNOSIS — R73.9 HYPERGLYCEMIA: ICD-10-CM

## 2024-07-18 DIAGNOSIS — E78.5 HYPERLIPIDEMIA, UNSPECIFIED HYPERLIPIDEMIA TYPE: ICD-10-CM

## 2024-07-18 PROCEDURE — 90746 HEPB VACCINE 3 DOSE ADULT IM: CPT | Performed by: FAMILY MEDICINE

## 2024-07-18 PROCEDURE — 99214 OFFICE O/P EST MOD 30 MIN: CPT | Performed by: FAMILY MEDICINE

## 2024-07-18 PROCEDURE — 90471 IMMUNIZATION ADMIN: CPT | Performed by: FAMILY MEDICINE

## 2024-07-18 RX ORDER — FEXOFENADINE HCL 180 MG/1
180 TABLET ORAL DAILY
Qty: 90 TABLET | Refills: 1 | Status: SHIPPED | OUTPATIENT
Start: 2024-07-18

## 2024-07-18 NOTE — PROGRESS NOTES
Follow Up Office Visit      Patient Name: Nilay Olvera  : 1965   MRN: [id number]     Chief Complaint:    Chief Complaint   Patient presents with    Follow-up       History of Present Illness: Nilay Olvera is a 59 y.o. male who is here today to   follow-up on his chronic medical problems and relates that he has been doing well he relates that he used to weigh over 300 pounds and really work to cut his calorie intake and his goal would be to get below 200 pounds.    Labs reviewed with him he is improved    Says only occasionally will he uses muscle relaxers like the methocarbamol or Flexeril for his restless leg syndrome.    He is due for hep B #3 today and did not have any troubles with prior shots he has no cold or fever symptoms no cold or sore throat etc.    Review of Systems   Constitutional: Negative for fatigue and fever.   Respiratory: Negative for cough and shortness of breath.    Cardiovascular: Negative for chest pain and palpitations.   Skin: Negative for rash or itching      Subjective      Review of Systems:   Review of Systems    Past Medical History:   Past Medical History:   Diagnosis Date    Depression        Past Surgical History:   Past Surgical History:   Procedure Laterality Date    LEG SURGERY Left     Left lower leg repair/pins placed    WRIST FRACTURE SURGERY Right     plastic plate replaced broken bone       Family History:   Family History   Problem Relation Age of Onset    No Known Problems Mother     No Known Problems Father     No Known Problems Sister     No Known Problems Brother     No Known Problems Maternal Grandmother     No Known Problems Maternal Grandfather     No Known Problems Paternal Grandmother     No Known Problems Paternal Grandfather        Social History:   Social History     Socioeconomic History    Marital status:    Tobacco Use    Smoking status: Former     Current packs/day: 0.00     Average packs/day: 1 pack/day for 44.0 years  "(44.0 ttl pk-yrs)     Types: Cigarettes     Start date: 1977     Quit date:      Years since quittin.5     Passive exposure: Past    Smokeless tobacco: Never   Vaping Use    Vaping status: Never Used   Substance and Sexual Activity    Alcohol use: Never    Drug use: Yes     Types: Marijuana    Sexual activity: Defer       Medications:     Current Outpatient Medications:     cyclobenzaprine (FLEXERIL) 5 MG tablet, Take 1 tablet by mouth 3 (Three) Times a Day As Needed for Muscle Spasms. Dosage unknown, Disp: , Rfl:     fexofenadine (Allegra Allergy) 180 MG tablet, Take 1 tablet by mouth Daily. As needed for allergy symptoms, Disp: 90 tablet, Rfl: 1    methocarbamol (ROBAXIN) 500 MG tablet, Take 1 tablet by mouth 4 (Four) Times a Day. Dosage unknown, Disp: , Rfl:     sertraline (ZOLOFT) 100 MG tablet, 1  and  half  tabs po  qd, Disp: 135 tablet, Rfl: 3    Allergies:   No Known Allergies    Objective     Physical Exam:  Vital Signs:   Vitals:    24 0801   BP: 110/70   Pulse: 74   SpO2: 97%   Weight: 104 kg (229 lb)   Height: 181.6 cm (71.5\")   PainSc: 0-No pain     Facility age limit for growth %nelson is 20 years.  Body mass index is 31.49 kg/m².     Physical Exam  Vitals and nursing note reviewed.   Constitutional:       Appearance: Normal appearance.   HENT:      Head: Normocephalic and atraumatic.      Nose: Nose normal.   Cardiovascular:      Rate and Rhythm: Normal rate and regular rhythm.   Pulmonary:      Effort: Pulmonary effort is normal.      Breath sounds: Normal breath sounds.   Musculoskeletal:         General: Normal range of motion.      Cervical back: Normal range of motion and neck supple.      Right lower leg: No edema.      Left lower leg: No edema.   Skin:     General: Skin is warm and dry.   Neurological:      General: No focal deficit present.      Mental Status: He is alert.   Psychiatric:         Mood and Affect: Mood normal.         Behavior: Behavior normal. "         Procedures    PHQ-9 Total Score:       Assessment / Plan      Assessment/Plan:   Diagnoses and all orders for this visit:    1. Allergic rhinitis, unspecified seasonality, unspecified trigger (Primary)  -     fexofenadine (Allegra Allergy) 180 MG tablet; Take 1 tablet by mouth Daily. As needed for allergy symptoms  Dispense: 90 tablet; Refill: 1    2. Immunization due  -     Hepatitis B Vaccine Adult IM    3. Hyperglycemia    4. Hyperlipidemia, unspecified hyperlipidemia type         Continue Allegra as directed for his allergies    Will update his hep B get his final installment on hep B #3    He understands injection site soreness redness may be the most common side effects agrees to proceed    For his hyperglycemia and hypertriglyceridemia hyperlipidemia work on diet exercise avoid fried fatty foods cut back on sweets and limit carbs and walk 30-60 minutes 5 days a week    Will follow-up in 6 months for physical and repeat blood work certainly may return sooner if any problems or worse  BMI is >= 30 and <35. (Class 1 Obesity). The following options were offered after discussion;: exercise counseling/recommendations and nutrition counseling/recommendations      Follow Up:   Return in about 6 months (around 1/18/2025) for Annual physical, Labs prior next visit.        Levi Mcrae MD  [facility]   Portions of note created with Dragon voice recognition technology

## 2025-02-07 DIAGNOSIS — F43.21 GRIEF REACTION: ICD-10-CM

## 2025-02-07 RX ORDER — SERTRALINE HYDROCHLORIDE 100 MG/1
TABLET, FILM COATED ORAL
Qty: 135 TABLET | Refills: 0 | Status: SHIPPED | OUTPATIENT
Start: 2025-02-07

## 2025-02-08 DIAGNOSIS — J30.9 ALLERGIC RHINITIS, UNSPECIFIED SEASONALITY, UNSPECIFIED TRIGGER: ICD-10-CM

## 2025-02-10 RX ORDER — FEXOFENADINE HCL 180 MG/1
180 TABLET ORAL DAILY
Qty: 90 TABLET | Refills: 1 | Status: SHIPPED | OUTPATIENT
Start: 2025-02-10

## 2025-04-13 DIAGNOSIS — F43.20 GRIEF REACTION: ICD-10-CM

## 2025-04-14 RX ORDER — SERTRALINE HYDROCHLORIDE 100 MG/1
150 TABLET, FILM COATED ORAL DAILY
Qty: 135 TABLET | Refills: 0 | OUTPATIENT
Start: 2025-04-14

## 2025-08-25 DIAGNOSIS — F43.20 GRIEF REACTION: ICD-10-CM

## 2025-08-25 DIAGNOSIS — J30.9 ALLERGIC RHINITIS, UNSPECIFIED SEASONALITY, UNSPECIFIED TRIGGER: ICD-10-CM

## 2025-08-25 RX ORDER — SERTRALINE HYDROCHLORIDE 100 MG/1
150 TABLET, FILM COATED ORAL DAILY
Qty: 135 TABLET | Refills: 0 | Status: SHIPPED | OUTPATIENT
Start: 2025-08-25

## 2025-08-25 RX ORDER — FEXOFENADINE HCL 180 MG/1
180 TABLET ORAL DAILY
Qty: 90 TABLET | Refills: 0 | Status: SHIPPED | OUTPATIENT
Start: 2025-08-25